# Patient Record
Sex: FEMALE | Race: WHITE | NOT HISPANIC OR LATINO
[De-identification: names, ages, dates, MRNs, and addresses within clinical notes are randomized per-mention and may not be internally consistent; named-entity substitution may affect disease eponyms.]

---

## 2017-02-20 ENCOUNTER — RESULT REVIEW (OUTPATIENT)
Age: 62
End: 2017-02-20

## 2018-02-08 ENCOUNTER — APPOINTMENT (OUTPATIENT)
Dept: INTERNAL MEDICINE | Facility: CLINIC | Age: 63
End: 2018-02-08
Payer: COMMERCIAL

## 2018-02-08 ENCOUNTER — NON-APPOINTMENT (OUTPATIENT)
Age: 63
End: 2018-02-08

## 2018-02-08 VITALS
OXYGEN SATURATION: 98 % | HEIGHT: 62.5 IN | DIASTOLIC BLOOD PRESSURE: 80 MMHG | TEMPERATURE: 98.1 F | HEART RATE: 74 BPM | BODY MASS INDEX: 24.94 KG/M2 | WEIGHT: 139 LBS | SYSTOLIC BLOOD PRESSURE: 128 MMHG

## 2018-02-08 DIAGNOSIS — Z87.01 PERSONAL HISTORY OF PNEUMONIA (RECURRENT): ICD-10-CM

## 2018-02-08 DIAGNOSIS — Z81.8 FAMILY HISTORY OF OTHER MENTAL AND BEHAVIORAL DISORDERS: ICD-10-CM

## 2018-02-08 DIAGNOSIS — Z87.19 PERSONAL HISTORY OF OTHER DISEASES OF THE DIGESTIVE SYSTEM: ICD-10-CM

## 2018-02-08 DIAGNOSIS — Z80.0 FAMILY HISTORY OF MALIGNANT NEOPLASM OF DIGESTIVE ORGANS: ICD-10-CM

## 2018-02-08 DIAGNOSIS — Z82.49 FAMILY HISTORY OF ISCHEMIC HEART DISEASE AND OTHER DISEASES OF THE CIRCULATORY SYSTEM: ICD-10-CM

## 2018-02-08 DIAGNOSIS — Z87.2 PERSONAL HISTORY OF DISEASES OF THE SKIN AND SUBCUTANEOUS TISSUE: ICD-10-CM

## 2018-02-08 DIAGNOSIS — Z78.9 OTHER SPECIFIED HEALTH STATUS: ICD-10-CM

## 2018-02-08 DIAGNOSIS — Z87.42 PERSONAL HISTORY OF OTHER DISEASES OF THE FEMALE GENITAL TRACT: ICD-10-CM

## 2018-02-08 PROCEDURE — 99386 PREV VISIT NEW AGE 40-64: CPT | Mod: 25

## 2018-02-08 PROCEDURE — 93000 ELECTROCARDIOGRAM COMPLETE: CPT

## 2018-02-08 PROCEDURE — 36415 COLL VENOUS BLD VENIPUNCTURE: CPT

## 2018-02-27 VITALS — DIASTOLIC BLOOD PRESSURE: 90 MMHG | SYSTOLIC BLOOD PRESSURE: 145 MMHG

## 2018-02-27 PROBLEM — Z82.49 FAMILY HISTORY OF ESSENTIAL HYPERTENSION: Status: ACTIVE | Noted: 2018-02-27

## 2018-02-27 PROBLEM — Z87.42 HISTORY OF OVARIAN CYST: Status: RESOLVED | Noted: 2018-02-27 | Resolved: 2018-02-27

## 2018-02-27 PROBLEM — Z87.2 HISTORY OF PILONIDAL CYST: Status: RESOLVED | Noted: 2018-02-27 | Resolved: 2018-02-27

## 2018-02-27 PROBLEM — Z87.19 HISTORY OF GASTROINTESTINAL HEMORRHAGE: Status: RESOLVED | Noted: 2018-02-27 | Resolved: 2018-02-27

## 2018-02-27 PROBLEM — Z78.9 SOCIAL ALCOHOL USE: Status: ACTIVE | Noted: 2018-02-27

## 2018-02-27 PROBLEM — Z81.8 FAMILY HISTORY OF DEPRESSION: Status: ACTIVE | Noted: 2018-02-27

## 2018-02-27 PROBLEM — Z87.01 HISTORY OF PNEUMONIA: Status: RESOLVED | Noted: 2018-02-27 | Resolved: 2018-02-27

## 2018-02-27 PROBLEM — Z80.0 FAMILY HISTORY OF MALIGNANT NEOPLASM OF COLON: Status: ACTIVE | Noted: 2018-02-27

## 2018-02-27 LAB
25(OH)D3 SERPL-MCNC: 29.1 NG/ML
APPEARANCE: CLEAR
BACTERIA: NEGATIVE
BASOPHILS # BLD AUTO: 0.03 K/UL
BASOPHILS NFR BLD AUTO: 0.7 %
BILIRUBIN URINE: NEGATIVE
BLOOD URINE: ABNORMAL
CHOLEST SERPL-MCNC: 194 MG/DL
CHOLEST/HDLC SERPL: 2.5 RATIO
COLOR: YELLOW
EOSINOPHIL # BLD AUTO: 0.05 K/UL
EOSINOPHIL NFR BLD AUTO: 1.1 %
FERRITIN SERPL-MCNC: 81 NG/ML
GLUCOSE QUALITATIVE U: NEGATIVE MG/DL
HBA1C MFR BLD HPLC: 5.2 %
HCT VFR BLD CALC: 41.3 %
HCV AB SER QL: NONREACTIVE
HCV S/CO RATIO: 0.2 S/CO
HDLC SERPL-MCNC: 77 MG/DL
HGB BLD-MCNC: 13.2 G/DL
HYALINE CASTS: 0 /LPF
IMM GRANULOCYTES NFR BLD AUTO: 0.2 %
IRON SATN MFR SERPL: 37 %
IRON SERPL-MCNC: 115 UG/DL
KETONES URINE: NEGATIVE
LDLC SERPL CALC-MCNC: 95 MG/DL
LEUKOCYTE ESTERASE URINE: NEGATIVE
LYMPHOCYTES # BLD AUTO: 1.82 K/UL
LYMPHOCYTES NFR BLD AUTO: 41 %
MAN DIFF?: NORMAL
MCHC RBC-ENTMCNC: 32 GM/DL
MCHC RBC-ENTMCNC: 32.4 PG
MCV RBC AUTO: 101.2 FL
MICROSCOPIC-UA: NORMAL
MONOCYTES # BLD AUTO: 0.54 K/UL
MONOCYTES NFR BLD AUTO: 12.2 %
NEUTROPHILS # BLD AUTO: 1.99 K/UL
NEUTROPHILS NFR BLD AUTO: 44.8 %
NITRITE URINE: NEGATIVE
PH URINE: 7
PLATELET # BLD AUTO: 220 K/UL
PROTEIN URINE: NEGATIVE MG/DL
RBC # BLD: 4.08 M/UL
RBC # FLD: 14.2 %
RED BLOOD CELLS URINE: 5 /HPF
SPECIFIC GRAVITY URINE: 1.02
SQUAMOUS EPITHELIAL CELLS: 1 /HPF
TIBC SERPL-MCNC: 307 UG/DL
TRIGL SERPL-MCNC: 110 MG/DL
UIBC SERPL-MCNC: 192 UG/DL
UROBILINOGEN URINE: NEGATIVE MG/DL
WBC # FLD AUTO: 4.44 K/UL
WHITE BLOOD CELLS URINE: 0 /HPF

## 2018-02-27 RX ORDER — CHLORHEXIDINE GLUCONATE 4 %
325 (65 FE) LIQUID (ML) TOPICAL EVERY OTHER DAY
Refills: 0 | Status: ACTIVE | COMMUNITY
Start: 2018-02-27

## 2018-02-27 RX ORDER — CLONAZEPAM 0.5 MG/1
0.5 TABLET ORAL
Refills: 0 | Status: ACTIVE | COMMUNITY
Start: 2018-02-27

## 2018-03-12 ENCOUNTER — APPOINTMENT (OUTPATIENT)
Dept: INTERNAL MEDICINE | Facility: CLINIC | Age: 63
End: 2018-03-12
Payer: COMMERCIAL

## 2018-03-12 VITALS
HEART RATE: 76 BPM | TEMPERATURE: 99.2 F | OXYGEN SATURATION: 98 % | HEIGHT: 62.5 IN | BODY MASS INDEX: 24.94 KG/M2 | DIASTOLIC BLOOD PRESSURE: 80 MMHG | WEIGHT: 139 LBS | SYSTOLIC BLOOD PRESSURE: 144 MMHG

## 2018-03-12 VITALS — DIASTOLIC BLOOD PRESSURE: 80 MMHG | SYSTOLIC BLOOD PRESSURE: 135 MMHG

## 2018-03-12 VITALS — DIASTOLIC BLOOD PRESSURE: 80 MMHG | SYSTOLIC BLOOD PRESSURE: 130 MMHG

## 2018-03-12 PROCEDURE — 99213 OFFICE O/P EST LOW 20 MIN: CPT

## 2018-03-20 ENCOUNTER — APPOINTMENT (OUTPATIENT)
Dept: UROLOGY | Facility: CLINIC | Age: 63
End: 2018-03-20
Payer: COMMERCIAL

## 2018-03-20 VITALS
SYSTOLIC BLOOD PRESSURE: 157 MMHG | DIASTOLIC BLOOD PRESSURE: 91 MMHG | TEMPERATURE: 97.7 F | HEART RATE: 69 BPM | RESPIRATION RATE: 17 BRPM

## 2018-03-20 LAB
APPEARANCE: CLEAR
BACTERIA: NEGATIVE
BILIRUBIN URINE: NEGATIVE
BLOOD URINE: ABNORMAL
COLOR: YELLOW
GLUCOSE QUALITATIVE U: NEGATIVE MG/DL
KETONES URINE: NEGATIVE
LEUKOCYTE ESTERASE URINE: NEGATIVE
MICROSCOPIC-UA: NORMAL
NITRITE URINE: NEGATIVE
PH URINE: 6.5
PROTEIN URINE: NEGATIVE MG/DL
RED BLOOD CELLS URINE: 9 /HPF
SPECIFIC GRAVITY URINE: 1.02
SQUAMOUS EPITHELIAL CELLS: 0 /HPF
UROBILINOGEN URINE: NEGATIVE MG/DL
WHITE BLOOD CELLS URINE: 2 /HPF

## 2018-03-20 PROCEDURE — 99244 OFF/OP CNSLTJ NEW/EST MOD 40: CPT

## 2018-03-22 LAB
BACTERIA UR CULT: NORMAL
CORE LAB FLUID CYTOLOGY: NORMAL

## 2018-03-27 ENCOUNTER — FORM ENCOUNTER (OUTPATIENT)
Age: 63
End: 2018-03-27

## 2018-03-28 ENCOUNTER — APPOINTMENT (OUTPATIENT)
Dept: CT IMAGING | Facility: CLINIC | Age: 63
End: 2018-03-28
Payer: COMMERCIAL

## 2018-03-28 ENCOUNTER — APPOINTMENT (OUTPATIENT)
Dept: UROLOGY | Facility: CLINIC | Age: 63
End: 2018-03-28
Payer: COMMERCIAL

## 2018-03-28 ENCOUNTER — OUTPATIENT (OUTPATIENT)
Dept: OUTPATIENT SERVICES | Facility: HOSPITAL | Age: 63
LOS: 1 days | End: 2018-03-28
Payer: COMMERCIAL

## 2018-03-28 VITALS — DIASTOLIC BLOOD PRESSURE: 76 MMHG | SYSTOLIC BLOOD PRESSURE: 131 MMHG | HEART RATE: 76 BPM | TEMPERATURE: 98.2 F

## 2018-03-28 DIAGNOSIS — Z00.8 ENCOUNTER FOR OTHER GENERAL EXAMINATION: ICD-10-CM

## 2018-03-28 DIAGNOSIS — R35.0 FREQUENCY OF MICTURITION: ICD-10-CM

## 2018-03-28 PROCEDURE — 52000 CYSTOURETHROSCOPY: CPT

## 2018-03-28 PROCEDURE — 82565 ASSAY OF CREATININE: CPT

## 2018-03-28 PROCEDURE — 74178 CT ABD&PLV WO CNTR FLWD CNTR: CPT

## 2018-03-28 PROCEDURE — 99214 OFFICE O/P EST MOD 30 MIN: CPT | Mod: 25

## 2018-03-28 PROCEDURE — 74178 CT ABD&PLV WO CNTR FLWD CNTR: CPT | Mod: 26

## 2018-03-30 DIAGNOSIS — F41.8 OTHER SPECIFIED ANXIETY DISORDERS: ICD-10-CM

## 2018-03-30 DIAGNOSIS — R31.29 OTHER MICROSCOPIC HEMATURIA: ICD-10-CM

## 2018-04-04 ENCOUNTER — RESULT REVIEW (OUTPATIENT)
Age: 63
End: 2018-04-04

## 2018-04-09 ENCOUNTER — APPOINTMENT (OUTPATIENT)
Dept: INTERNAL MEDICINE | Facility: CLINIC | Age: 63
End: 2018-04-09

## 2018-04-09 LAB
APPEARANCE: CLEAR
BACTERIA UR CULT: NORMAL
BACTERIA: NEGATIVE
BILIRUBIN URINE: NEGATIVE
BLOOD URINE: ABNORMAL
COLOR: YELLOW
CORE LAB FLUID CYTOLOGY: NORMAL
GLUCOSE QUALITATIVE U: NEGATIVE MG/DL
HYALINE CASTS: 1 /LPF
KETONES URINE: NEGATIVE
LEUKOCYTE ESTERASE URINE: NEGATIVE
MICROSCOPIC-UA: NORMAL
NITRITE URINE: NEGATIVE
PH URINE: 6
PROTEIN URINE: NEGATIVE MG/DL
RED BLOOD CELLS URINE: 6 /HPF
SPECIFIC GRAVITY URINE: 1.05
SQUAMOUS EPITHELIAL CELLS: 0 /HPF
UROBILINOGEN URINE: NEGATIVE MG/DL
WHITE BLOOD CELLS URINE: 2 /HPF

## 2018-06-28 ENCOUNTER — APPOINTMENT (OUTPATIENT)
Dept: INTERNAL MEDICINE | Facility: CLINIC | Age: 63
End: 2018-06-28
Payer: COMMERCIAL

## 2018-06-28 VITALS
OXYGEN SATURATION: 98 % | WEIGHT: 140 LBS | TEMPERATURE: 98.2 F | BODY MASS INDEX: 25.76 KG/M2 | HEART RATE: 68 BPM | HEIGHT: 62 IN

## 2018-06-28 VITALS — SYSTOLIC BLOOD PRESSURE: 128 MMHG | DIASTOLIC BLOOD PRESSURE: 80 MMHG

## 2018-06-28 PROCEDURE — 99214 OFFICE O/P EST MOD 30 MIN: CPT

## 2018-06-28 NOTE — ASSESSMENT
[FreeTextEntry1] : The blood pressure is better today- monitor.  Discussed diet and exercise.  \par \par She had the hematuria evaluation- see note from .\par \par She is due for a mammogram and she was reminded to do it.\par \par She has similar mild swallowing symptoms which she feels is in her throat.  She can feel a momentary throat throbbing.  Symptoms not worse.  She was advised to see an ENT to evaluate.

## 2018-06-28 NOTE — PHYSICAL EXAM
[No Acute Distress] : no acute distress [Well Nourished] : well nourished [Well Developed] : well developed [No Respiratory Distress] : no respiratory distress  [Clear to Auscultation] : lungs were clear to auscultation bilaterally [No Accessory Muscle Use] : no accessory muscle use [Normal Rate] : normal rate  [Regular Rhythm] : with a regular rhythm [Normal S1, S2] : normal S1 and S2 [No Murmur] : no murmur heard [Pedal Pulses Present] : the pedal pulses are present [No Edema] : there was no peripheral edema [Soft] : abdomen soft [Non Tender] : non-tender [Non-distended] : non-distended [No HSM] : no HSM

## 2019-04-26 ENCOUNTER — NON-APPOINTMENT (OUTPATIENT)
Age: 64
End: 2019-04-26

## 2019-04-26 ENCOUNTER — APPOINTMENT (OUTPATIENT)
Dept: INTERNAL MEDICINE | Facility: CLINIC | Age: 64
End: 2019-04-26
Payer: COMMERCIAL

## 2019-04-26 VITALS
TEMPERATURE: 97.5 F | OXYGEN SATURATION: 98 % | WEIGHT: 144 LBS | HEIGHT: 62 IN | BODY MASS INDEX: 26.5 KG/M2 | HEART RATE: 73 BPM

## 2019-04-26 DIAGNOSIS — Z00.00 ENCOUNTER FOR GENERAL ADULT MEDICAL EXAMINATION W/OUT ABNORMAL FINDINGS: ICD-10-CM

## 2019-04-26 PROCEDURE — 36415 COLL VENOUS BLD VENIPUNCTURE: CPT

## 2019-04-26 PROCEDURE — 93000 ELECTROCARDIOGRAM COMPLETE: CPT

## 2019-04-26 PROCEDURE — 99396 PREV VISIT EST AGE 40-64: CPT | Mod: 25

## 2019-04-26 PROCEDURE — G0444 DEPRESSION SCREEN ANNUAL: CPT

## 2019-04-27 VITALS — SYSTOLIC BLOOD PRESSURE: 120 MMHG | DIASTOLIC BLOOD PRESSURE: 72 MMHG

## 2019-04-27 NOTE — ASSESSMENT
[FreeTextEntry1] : She appears to be doing well overall from a psychiatric standpoint and she was counseled.  She sees her psychiatrist.  Check additional blood tests requested by Dr. Swanson. \par \par She has some exertional dyspnea and an occasional flutter.  The EKG is abnormal but unchanged.  We agreed she will see a cardiologist to be thorough.  She will see her 's cardiologist.  Discussed diet and exercise.\par \par Check a U/A and cytology- she had a previous hematuria evaluation which was unremarkable.  Routine recheck of the urine.\par \par Check a CBC- previous anemia.  \par \par She will see her gyn in 1-2 weeks.  Mammogram might be due and was advised.  She says her mammogram and DEXA are ordered by her gyn.  \par \par Colonoscopy and EGD about three years ago- likely five year follow-up.  She will have the report sent.  \par \par She sees a dermatologist and ophthalmologist.  \par \par She has right posterior ankle pain that might be from the Achilles- possible tendonitis.  She was advised to see a podiatrist.  \par \par

## 2019-04-27 NOTE — HEALTH RISK ASSESSMENT
[No falls in past year] : Patient reported no falls in the past year [1] : 2) Feeling down, depressed, or hopeless for several days (1) [Fully functional (bathing, dressing, toileting, transferring, walking, feeding)] : Fully functional (bathing, dressing, toileting, transferring, walking, feeding) [Fully functional (using the telephone, shopping, preparing meals, housekeeping, doing laundry, using] : Fully functional and needs no help or supervision to perform IADLs (using the telephone, shopping, preparing meals, housekeeping, doing laundry, using transportation, managing medications and managing finances) [] : No [[id number]] : 2 [Change in mental status noted] : No change in mental status noted [Reports changes in hearing] : Reports no changes in hearing [Reports changes in vision] : Reports no changes in vision [Reports changes in dental health] : Reports no changes in dental health

## 2019-05-07 ENCOUNTER — RESULT REVIEW (OUTPATIENT)
Age: 64
End: 2019-05-07

## 2019-05-08 LAB
25(OH)D3 SERPL-MCNC: 45.2 NG/ML
ALBUMIN SERPL ELPH-MCNC: 4.5 G/DL
ALP BLD-CCNC: 61 U/L
ALT SERPL-CCNC: 14 U/L
ANION GAP SERPL CALC-SCNC: 13 MMOL/L
APPEARANCE: CLEAR
AST SERPL-CCNC: 16 U/L
BACTERIA: NEGATIVE
BASOPHILS # BLD AUTO: 0.02 K/UL
BASOPHILS NFR BLD AUTO: 0.4 %
BILIRUB SERPL-MCNC: 0.4 MG/DL
BILIRUBIN URINE: NEGATIVE
BLOOD URINE: ABNORMAL
BUN SERPL-MCNC: 15 MG/DL
CALCIUM SERPL-MCNC: 9.9 MG/DL
CHLORIDE SERPL-SCNC: 102 MMOL/L
CHOLEST SERPL-MCNC: 189 MG/DL
CHOLEST/HDLC SERPL: 3 RATIO
CO2 SERPL-SCNC: 25 MMOL/L
COLOR: YELLOW
CREAT SERPL-MCNC: 0.69 MG/DL
EOSINOPHIL # BLD AUTO: 0.08 K/UL
EOSINOPHIL NFR BLD AUTO: 1.7 %
ESTIMATED AVERAGE GLUCOSE: 103 MG/DL
FOLATE SERPL-MCNC: >20 NG/ML
GLUCOSE QUALITATIVE U: NEGATIVE
GLUCOSE SERPL-MCNC: 101 MG/DL
HBA1C MFR BLD HPLC: 5.2 %
HCT VFR BLD CALC: 41.9 %
HDLC SERPL-MCNC: 63 MG/DL
HGB BLD-MCNC: 13.4 G/DL
HYALINE CASTS: 0 /LPF
IMM GRANULOCYTES NFR BLD AUTO: 0 %
KETONES URINE: NEGATIVE
LDH SERPL-CCNC: 163 U/L
LDLC SERPL CALC-MCNC: 110 MG/DL
LEUKOCYTE ESTERASE URINE: NEGATIVE
LYMPHOCYTES # BLD AUTO: 1.61 K/UL
LYMPHOCYTES NFR BLD AUTO: 34.6 %
MAN DIFF?: NORMAL
MCHC RBC-ENTMCNC: 32 GM/DL
MCHC RBC-ENTMCNC: 33.2 PG
MCV RBC AUTO: 103.7 FL
MICROSCOPIC-UA: NORMAL
MONOCYTES # BLD AUTO: 0.48 K/UL
MONOCYTES NFR BLD AUTO: 10.3 %
NEUTROPHILS # BLD AUTO: 2.46 K/UL
NEUTROPHILS NFR BLD AUTO: 53 %
NITRITE URINE: NEGATIVE
PH URINE: 6.5
PLATELET # BLD AUTO: 201 K/UL
POTASSIUM SERPL-SCNC: 4.6 MMOL/L
PROT SERPL-MCNC: 7.4 G/DL
PROTEIN URINE: NORMAL
RBC # BLD: 4.04 M/UL
RBC # FLD: 13 %
RED BLOOD CELLS URINE: 3 /HPF
SODIUM SERPL-SCNC: 140 MMOL/L
SPECIFIC GRAVITY URINE: 1.03
SQUAMOUS EPITHELIAL CELLS: 1 /HPF
T4 FREE SERPL-MCNC: 1 NG/DL
T4 SERPL-MCNC: 4.8 UG/DL
THYROGLOB AB SERPL-ACNC: <20 IU/ML
THYROPEROXIDASE AB SERPL IA-ACNC: 29.1 IU/ML
TRIGL SERPL-MCNC: 78 MG/DL
TSH SERPL-ACNC: 1.71 UIU/ML
URATE SERPL-MCNC: 3.6 MG/DL
URINE CYTOLOGY: NORMAL
UROBILINOGEN URINE: NORMAL
VIT B12 SERPL-MCNC: 595 PG/ML
WBC # FLD AUTO: 4.65 K/UL
WHITE BLOOD CELLS URINE: 1 /HPF

## 2019-05-29 ENCOUNTER — APPOINTMENT (OUTPATIENT)
Dept: ORTHOPEDIC SURGERY | Facility: CLINIC | Age: 64
End: 2019-05-29
Payer: COMMERCIAL

## 2019-05-29 DIAGNOSIS — M25.551 PAIN IN RIGHT HIP: ICD-10-CM

## 2019-05-29 PROCEDURE — 99204 OFFICE O/P NEW MOD 45 MIN: CPT

## 2019-05-29 PROCEDURE — 73502 X-RAY EXAM HIP UNI 2-3 VIEWS: CPT | Mod: RT

## 2019-05-29 NOTE — DISCUSSION/SUMMARY
[de-identified] : This patient has right buttock pain secondary to right lower extremity radiculopathy.  The right hip joint does not appear to be the source of the problem and she has a normal hip examination normal radiographs.  Control was made to physiatry for further work-up and management.  She will continue with the Tylenol.

## 2019-05-29 NOTE — PHYSICAL EXAM
[de-identified] : Patient is well nourished, well-developed, in no acute distress, with appropriate mood and affect. The patient is oriented to time, place, and person. Respirations are even and unlabored. Gait evaluation does not reveal a limp. There is no inguinal adenopathy. Examination of the contralateral hip shows normal range of motion, strength, no tenderness, and intact skin. The affected limb is well-perfused and showed 2+ dp/pt pulses, without skin lesions, shows a grossly normal motor and sensory examination. Examination of the hip shows no skin lesions. Hip motion is full and painless from 0-90 degrees extension to flexion, 20 degrees adduction and 20 degrees abduction, and 15 degrees internal and 30 degrees external rotation. Leg lengths are approximately equal. FADIR is negative and FCO is negative. Stinchfield test is negative. Both hips are stable and muscle strength is normal with good strength with resisted abduction and adduction. Pedal pulses are palpable. [de-identified] : AP and lateral x-rays of the right hip, pelvis, and femur were ordered and taken in the office and demonstrate no evidence of degenerative joint disease of the hip with maintained joint space and no evidence of fractures or other intraarticular pathology.

## 2019-05-29 NOTE — HISTORY OF PRESENT ILLNESS
[de-identified] : This is very nice 63-year-old female experiencing 5 days of right buttock pain, which is severe in intensity. The pain substantially limits activities of daily living. Walking tolerance is reduced.  This started atraumatically.  The pain is in the low back that transitions into the right buttock.  No groin pain.  The patient denies any radiation of the pain to the feet and it is not associated with numbness, tingling, or weakness.  Tylenol helps to improve the pain.  She cannot take NSAIDs because of a history of a gastric ulcer.  She does not use a cane or walker.  She has not tried physical therapy for this.

## 2019-06-05 ENCOUNTER — OUTPATIENT (OUTPATIENT)
Dept: OUTPATIENT SERVICES | Facility: HOSPITAL | Age: 64
LOS: 1 days | End: 2019-06-05
Payer: COMMERCIAL

## 2019-06-05 ENCOUNTER — APPOINTMENT (OUTPATIENT)
Dept: RADIOLOGY | Facility: CLINIC | Age: 64
End: 2019-06-05
Payer: COMMERCIAL

## 2019-06-05 DIAGNOSIS — M54.5 LOW BACK PAIN: ICD-10-CM

## 2019-06-05 PROCEDURE — 72100 X-RAY EXAM L-S SPINE 2/3 VWS: CPT

## 2019-06-05 PROCEDURE — 72100 X-RAY EXAM L-S SPINE 2/3 VWS: CPT | Mod: 26

## 2019-11-25 NOTE — HISTORY OF PRESENT ILLNESS
[FreeTextEntry1] : The patient is here for a routine visit.  [de-identified] : She sees her psychiatrist, Dr. Swanson, every two months.  She can still have some mild anxiety and depression symptoms but she is doing well overall.  She worries about her daughter who is a college student.  \par \par She sees her hematologist every six months for the history of iron deficiency.  She says her last iron tests were good.  \par \par She never saw an ENT.  She can have an occasional hoarse voice but it doesn't bother her.  No significant swallowing issue.  \par \par Her diet is mostly healthy.  She doesn't exercise.  She notes occasional dyspnea with mild exertion and she can feel a heart flutter at times.  No chest pain or pressure.   No significant past surgical history

## 2020-10-15 ENCOUNTER — INPATIENT (INPATIENT)
Facility: HOSPITAL | Age: 65
LOS: 0 days | Discharge: HOME | DRG: 312 | End: 2020-10-16
Attending: FAMILY MEDICINE | Admitting: INTERNAL MEDICINE
Payer: MEDICARE

## 2020-10-15 VITALS
DIASTOLIC BLOOD PRESSURE: 75 MMHG | SYSTOLIC BLOOD PRESSURE: 125 MMHG | HEIGHT: 64 IN | OXYGEN SATURATION: 96 % | WEIGHT: 134.92 LBS | RESPIRATION RATE: 16 BRPM | TEMPERATURE: 98 F | HEART RATE: 70 BPM

## 2020-10-15 LAB
BASOPHILS # BLD AUTO: 0.02 K/UL — SIGNIFICANT CHANGE UP (ref 0–0.2)
BASOPHILS NFR BLD AUTO: 0.4 % — SIGNIFICANT CHANGE UP (ref 0–2)
EOSINOPHIL # BLD AUTO: 0 K/UL — SIGNIFICANT CHANGE UP (ref 0–0.5)
EOSINOPHIL NFR BLD AUTO: 0 % — SIGNIFICANT CHANGE UP (ref 0–6)
HCT VFR BLD CALC: 41.8 % — SIGNIFICANT CHANGE UP (ref 34.5–45)
HGB BLD-MCNC: 14.2 G/DL — SIGNIFICANT CHANGE UP (ref 11.5–15.5)
IMM GRANULOCYTES NFR BLD AUTO: 0 % — SIGNIFICANT CHANGE UP (ref 0–1.5)
LYMPHOCYTES # BLD AUTO: 0.96 K/UL — LOW (ref 1–3.3)
LYMPHOCYTES # BLD AUTO: 19.8 % — SIGNIFICANT CHANGE UP (ref 13–44)
MCHC RBC-ENTMCNC: 33.5 PG — SIGNIFICANT CHANGE UP (ref 27–34)
MCHC RBC-ENTMCNC: 34 GM/DL — SIGNIFICANT CHANGE UP (ref 32–36)
MCV RBC AUTO: 98.6 FL — SIGNIFICANT CHANGE UP (ref 80–100)
MONOCYTES # BLD AUTO: 0.19 K/UL — SIGNIFICANT CHANGE UP (ref 0–0.9)
MONOCYTES NFR BLD AUTO: 3.9 % — SIGNIFICANT CHANGE UP (ref 2–14)
NEUTROPHILS # BLD AUTO: 3.69 K/UL — SIGNIFICANT CHANGE UP (ref 1.8–7.4)
NEUTROPHILS NFR BLD AUTO: 75.9 % — SIGNIFICANT CHANGE UP (ref 43–77)
PLATELET # BLD AUTO: 209 K/UL — SIGNIFICANT CHANGE UP (ref 150–400)
RBC # BLD: 4.24 M/UL — SIGNIFICANT CHANGE UP (ref 3.8–5.2)
RBC # FLD: 13.5 % — SIGNIFICANT CHANGE UP (ref 10.3–14.5)
WBC # BLD: 4.86 K/UL — SIGNIFICANT CHANGE UP (ref 3.8–10.5)
WBC # FLD AUTO: 4.86 K/UL — SIGNIFICANT CHANGE UP (ref 3.8–10.5)

## 2020-10-15 PROCEDURE — 70486 CT MAXILLOFACIAL W/O DYE: CPT | Mod: 26

## 2020-10-15 PROCEDURE — 70450 CT HEAD/BRAIN W/O DYE: CPT | Mod: 26

## 2020-10-15 PROCEDURE — 93010 ELECTROCARDIOGRAM REPORT: CPT

## 2020-10-15 PROCEDURE — 72125 CT NECK SPINE W/O DYE: CPT | Mod: 26

## 2020-10-15 PROCEDURE — 71045 X-RAY EXAM CHEST 1 VIEW: CPT | Mod: 26

## 2020-10-15 PROCEDURE — 76376 3D RENDER W/INTRP POSTPROCES: CPT | Mod: 26

## 2020-10-15 RX ORDER — SODIUM CHLORIDE 9 MG/ML
1000 INJECTION INTRAMUSCULAR; INTRAVENOUS; SUBCUTANEOUS ONCE
Refills: 0 | Status: COMPLETED | OUTPATIENT
Start: 2020-10-15 | End: 2020-10-15

## 2020-10-15 RX ADMIN — SODIUM CHLORIDE 1000 MILLILITER(S): 9 INJECTION INTRAMUSCULAR; INTRAVENOUS; SUBCUTANEOUS at 23:37

## 2020-10-15 NOTE — ED ADULT NURSE NOTE - OBJECTIVE STATEMENT
Pt states she was at home and having a party alone drinking. Pt states she was drinking gin. Does not know where the blood on her nose came from. Pt with noted bruise to bridge of nose. Pt states she does not know why her family sent her to ER. Pt denies falling and states she was on the couch when her family came home.

## 2020-10-15 NOTE — ED PROVIDER NOTE - OBJECTIVE STATEMENT
63 yo female with h/o anxiety and depression biba.  Pt states that her family had gone out of the house around 5pm so she decided to "have a party" with herself and drank " a bit" of gin.  Pt woke on the couch when her family came home.  +dried blood on face and hands. Pt doesn't remember falling.  Denies complaints.    Pt family was concerned because she was slurring her words and seemed confused.

## 2020-10-15 NOTE — ED PROVIDER NOTE - CARE PLAN
Principal Discharge DX:	Syncope  Secondary Diagnosis:	Alcohol intoxication  Secondary Diagnosis:	Nasal fracture  Secondary Diagnosis:	Abnormal ECG

## 2020-10-15 NOTE — ED PROVIDER NOTE - ENMT, MLM
Airway patent, Nasal mucosa clear. Mouth with normal mucosa. Throat has no vesicles, no oropharyngeal exudates and uvula is midline.  Dried blood on nose with small amount of ecchymosis to tip /bridge of nose without tenderness.  no septal hematoma. no epistaxis

## 2020-10-15 NOTE — ED ADULT TRIAGE NOTE - CHIEF COMPLAINT QUOTE
pt BIBA s/p found on floor by family members after being out all day. + AOB w/ bloody nose. pt unaware of what happened today, moves all extremities, no deformities, lacerations or abrasions noted in triage.

## 2020-10-15 NOTE — ED ADULT NURSE NOTE - NSIMPLEMENTINTERV_GEN_ALL_ED
Implemented All Fall Risk Interventions:  [place] to call system. Call bell, personal items and telephone within reach. Instruct patient to call for assistance. Room bathroom lighting operational. Non-slip footwear when patient is off stretcher. Physically safe environment: no spills, clutter or unnecessary equipment. Stretcher in lowest position, wheels locked, appropriate side rails in place. Provide visual cue, wrist band, yellow gown, etc. Monitor gait and stability. Monitor for mental status changes and reorient to person, place, and time. Review medications for side effects contributing to fall risk. Reinforce activity limits and safety measures with patient and family.

## 2020-10-16 ENCOUNTER — TRANSCRIPTION ENCOUNTER (OUTPATIENT)
Age: 65
End: 2020-10-16

## 2020-10-16 VITALS
DIASTOLIC BLOOD PRESSURE: 72 MMHG | OXYGEN SATURATION: 98 % | TEMPERATURE: 98 F | HEART RATE: 79 BPM | SYSTOLIC BLOOD PRESSURE: 131 MMHG | RESPIRATION RATE: 17 BRPM

## 2020-10-16 DIAGNOSIS — R55 SYNCOPE AND COLLAPSE: ICD-10-CM

## 2020-10-16 LAB
ALBUMIN SERPL ELPH-MCNC: 4.2 G/DL — SIGNIFICANT CHANGE UP (ref 3.3–5)
ALP SERPL-CCNC: 91 U/L — SIGNIFICANT CHANGE UP (ref 40–120)
ALT FLD-CCNC: 49 U/L — SIGNIFICANT CHANGE UP (ref 12–78)
ANION GAP SERPL CALC-SCNC: 5 MMOL/L — SIGNIFICANT CHANGE UP (ref 5–17)
APAP SERPL-MCNC: <2 UG/ML — LOW (ref 10–30)
AST SERPL-CCNC: 39 U/L — HIGH (ref 15–37)
BILIRUB SERPL-MCNC: 0.4 MG/DL — SIGNIFICANT CHANGE UP (ref 0.2–1.2)
BUN SERPL-MCNC: 10 MG/DL — SIGNIFICANT CHANGE UP (ref 7–23)
CALCIUM SERPL-MCNC: 8.4 MG/DL — LOW (ref 8.5–10.1)
CHLORIDE SERPL-SCNC: 107 MMOL/L — SIGNIFICANT CHANGE UP (ref 96–108)
CO2 SERPL-SCNC: 27 MMOL/L — SIGNIFICANT CHANGE UP (ref 22–31)
CREAT SERPL-MCNC: 0.68 MG/DL — SIGNIFICANT CHANGE UP (ref 0.5–1.3)
ETHANOL SERPL-MCNC: 343 MG/DL — HIGH (ref 0–10)
GLUCOSE SERPL-MCNC: 113 MG/DL — HIGH (ref 70–99)
HCV AB S/CO SERPL IA: 0.08 S/CO — SIGNIFICANT CHANGE UP (ref 0–0.99)
HCV AB SERPL-IMP: SIGNIFICANT CHANGE UP
PCP SPEC-MCNC: SIGNIFICANT CHANGE UP
POTASSIUM SERPL-MCNC: 4.6 MMOL/L — SIGNIFICANT CHANGE UP (ref 3.5–5.3)
POTASSIUM SERPL-SCNC: 4.6 MMOL/L — SIGNIFICANT CHANGE UP (ref 3.5–5.3)
PROT SERPL-MCNC: 10.1 GM/DL — HIGH (ref 6–8.3)
SALICYLATES SERPL-MCNC: <1.7 MG/DL — LOW (ref 2.8–20)
SARS-COV-2 IGG SERPL QL IA: NEGATIVE — SIGNIFICANT CHANGE UP
SARS-COV-2 IGM SERPL IA-ACNC: <0.1 INDEX — SIGNIFICANT CHANGE UP
SARS-COV-2 RNA SPEC QL NAA+PROBE: SIGNIFICANT CHANGE UP
SODIUM SERPL-SCNC: 139 MMOL/L — SIGNIFICANT CHANGE UP (ref 135–145)
TROPONIN I SERPL-MCNC: <0.015 NG/ML — SIGNIFICANT CHANGE UP (ref 0.01–0.04)
TROPONIN I SERPL-MCNC: <0.015 NG/ML — SIGNIFICANT CHANGE UP (ref 0.01–0.04)

## 2020-10-16 PROCEDURE — 86769 SARS-COV-2 COVID-19 ANTIBODY: CPT

## 2020-10-16 PROCEDURE — 84484 ASSAY OF TROPONIN QUANT: CPT

## 2020-10-16 PROCEDURE — 93306 TTE W/DOPPLER COMPLETE: CPT

## 2020-10-16 PROCEDURE — 99223 1ST HOSP IP/OBS HIGH 75: CPT

## 2020-10-16 PROCEDURE — U0003: CPT

## 2020-10-16 PROCEDURE — 12345: CPT | Mod: NC

## 2020-10-16 PROCEDURE — 93306 TTE W/DOPPLER COMPLETE: CPT | Mod: 26

## 2020-10-16 PROCEDURE — 86803 HEPATITIS C AB TEST: CPT

## 2020-10-16 PROCEDURE — 36415 COLL VENOUS BLD VENIPUNCTURE: CPT

## 2020-10-16 RX ORDER — ACETAMINOPHEN 500 MG
650 TABLET ORAL EVERY 6 HOURS
Refills: 0 | Status: DISCONTINUED | OUTPATIENT
Start: 2020-10-16 | End: 2020-10-16

## 2020-10-16 RX ORDER — CLONAZEPAM 1 MG
0.5 TABLET ORAL
Refills: 0 | Status: DISCONTINUED | OUTPATIENT
Start: 2020-10-16 | End: 2020-10-16

## 2020-10-16 RX ORDER — SODIUM CHLORIDE 9 MG/ML
1000 INJECTION INTRAMUSCULAR; INTRAVENOUS; SUBCUTANEOUS
Refills: 0 | Status: DISCONTINUED | OUTPATIENT
Start: 2020-10-16 | End: 2020-10-16

## 2020-10-16 RX ORDER — ONDANSETRON 8 MG/1
4 TABLET, FILM COATED ORAL EVERY 6 HOURS
Refills: 0 | Status: DISCONTINUED | OUTPATIENT
Start: 2020-10-16 | End: 2020-10-16

## 2020-10-16 RX ORDER — ASPIRIN/CALCIUM CARB/MAGNESIUM 324 MG
325 TABLET ORAL ONCE
Refills: 0 | Status: COMPLETED | OUTPATIENT
Start: 2020-10-16 | End: 2020-10-16

## 2020-10-16 RX ORDER — SERTRALINE 25 MG/1
37.5 TABLET, FILM COATED ORAL DAILY
Refills: 0 | Status: DISCONTINUED | OUTPATIENT
Start: 2020-10-16 | End: 2020-10-16

## 2020-10-16 RX ADMIN — SERTRALINE 37.5 MILLIGRAM(S): 25 TABLET, FILM COATED ORAL at 07:37

## 2020-10-16 RX ADMIN — Medication 650 MILLIGRAM(S): at 12:18

## 2020-10-16 RX ADMIN — SODIUM CHLORIDE 1000 MILLILITER(S): 9 INJECTION INTRAMUSCULAR; INTRAVENOUS; SUBCUTANEOUS at 00:03

## 2020-10-16 NOTE — CONSULT NOTE ADULT - ASSESSMENT
Syncope- high alcohol level noted in blood.  Check orthostatic BP readings with 5 mins allowed between readings.  no arrythmias on tele.  Ambulate pt if no orthostasis to reasses symptoms.  If she is asymptomatic she can be discharged and I can see her MOnday in office for f/u.  Pt expressed full understanding of the plan.    Alcohol abuse - advised about the risks of alcohol abuse.    Advised pt to quit alcohol.    Other medical issues- Management per primary team.   Thank you for allowing me to participate in the care of this patient. Please feel free to contact me with any questions.

## 2020-10-16 NOTE — ED ADULT NURSE REASSESSMENT NOTE - NS ED NURSE REASSESS COMMENT FT1
Pt received from previous RN.  Pt aaox3.  No c/o pain or discomfort, in no apparent distress.  Plan of care, transition from ED to admitted status discussed with pt.  All questions answered to pt satisfaction.   Call bell given to patient.  No additional needs at this time, will continue hourly rounding.   Pt identified as fall w harm risk. Bed in lowest locked position, pt in room directly across from the nurses station for enhanced safety monitoring. call bell within patient reach.  Pt instructed re: call bell use. Teaches back proper use and verbalizes agreement to utilize bell and wait safely for staff for assistance with needs. Pt clean and dry, all belongings in reach. Will continue hourly rounding.

## 2020-10-16 NOTE — H&P ADULT - VASCULAR
Anesthesia POST Procedure Evaluation    Patient: Marquez Anaya   MRN:     [id number] Gender:   male   Age:    66 year old :      1953        Preoperative Diagnosis: Multiple myeloma in remission (H) [C90.01]   Procedure(s):  BIOPSY, BONE MARROW   Postop Comments: No value filed.     Anesthesia Type: MAC          Postop Pain Control: Uneventful            Sign Out: Well controlled pain   PONV: No   Neuro/Psych: Uneventful            Sign Out: Acceptable/Baseline neuro status   Airway/Respiratory: Uneventful            Sign Out: Acceptable/Baseline resp. status   CV/Hemodynamics: Uneventful            Sign Out: Acceptable CV status   Other NRE: NONE   DID A NON-ROUTINE EVENT OCCUR? No         Last Anesthesia Record Vitals:  CRNA VITALS  2020 1054 - 2020 1154      2020             Pulse:  71    SpO2:  99 %          Last PACU Vitals:  Vitals Value Taken Time   BP 98/69 2020 11:30 AM   Temp 36.9  C (98.5  F) 2020 11:30 AM   Pulse 66 2020 11:30 AM   Resp 16 2020 11:30 AM   SpO2 97 % 2020 11:30 AM   Temp src Available 2020 11:17 AM   NIBP 94/67 2020 11:20 AM   Pulse 71 2020 11:23 AM   SpO2 99 % 2020 11:23 AM   Resp 11 2020 11:22 AM   Temp     Ht Rate 70 2020 11:21 AM   Temp 2           Electronically Signed By: Alec Fu MD, 2020, 3:33 PM  
detailed exam

## 2020-10-16 NOTE — CONSULT NOTE ADULT - SUBJECTIVE AND OBJECTIVE BOX
Patient is a 64y old  Female who presents with a chief complaint of Syncope.       HPI:  65 yo Female with h/o anxiety and depression BIBEMS after she was found on her couch passed out. She states that her family had gone out of the house around 5pm so she decided to "have a party" with herself and drank " a bit" of gin. Patient woke on the couch when her family  came home. She was found to have +dried blood on face and hands. Patient doesn't remember falling. She Denies any other complaints. Patient family was concerned because she was slurring her words and seemed confused.    Pt denies any previous       PAST MEDICAL & SURGICAL HISTORY:  Anxiety and depression    No significant past surgical history        MEDICATIONS  (STANDING):  sertraline 37.5 milliGRAM(s) Oral daily  sodium chloride 0.9%. 1000 milliLiter(s) (125 mL/Hr) IV Continuous <Continuous>    MEDICATIONS  (PRN):  acetaminophen   Tablet .. 650 milliGRAM(s) Oral every 6 hours PRN Mild Pain (1 - 3)  clonazePAM  Tablet 0.5 milliGRAM(s) Oral two times a day PRN Anxiety  ondansetron Injectable 4 milliGRAM(s) IV Push every 6 hours PRN Nausea and/or Vomiting      FAMILY HISTORY:  No pertinent family history in first degree relatives        SOCIAL HISTORY:    REVIEW OF SYSTEMS:  CONSTITUTIONAL:    No fatigue, malaise, lethargy.  No fever or chills.  HEENT:  Eyes:  No visual changes.     ENT:  No epistaxis.  No sinus pain.    RESPIRATORY:  No cough.  No wheeze.  No hemoptysis.  No shortness of breath.  CARDIOVASCULAR:  No chest pains.  No palpitations. No shortness of breath, No orthopnea or PND.  GASTROINTESTINAL:  No abdominal pain.  No nausea or vomiting.    GENITOURINARY:    No hematuria.    MUSCULOSKELETAL:  No musculoskeletal pain.  No joint swelling.  No arthritis.  NEUROLOGICAL:  No tingling or numbness or weakness.  PSYCHIATRIC:  No confusion  SKIN:  No rashes.    ENDOCRINE:  No unexplained weight loss.  No polydipsia.   HEMATOLOGIC:  No anemia.  No prolonged or excessive bleeding.   ALLERGIC AND IMMUNOLOGIC:  No pruritus.          Vital Signs Last 24 Hrs  T(C): 36.7 (16 Oct 2020 04:29), Max: 36.7 (16 Oct 2020 04:29)  T(F): 98 (16 Oct 2020 04:29), Max: 98 (16 Oct 2020 04:29)  HR: 68 (16 Oct 2020 04:29) (68 - 70)  BP: 124/72 (16 Oct 2020 04:29) (124/72 - 125/75)  BP(mean): --  RR: 16 (16 Oct 2020 04:29) (16 - 16)  SpO2: 97% (16 Oct 2020 04:29) (96% - 97%)    PHYSICAL EXAM-    Constitutional: The patient appears to be normal, well developed, well nourished and alert and oriented to time, place and person. The patient does not appear acutely ill.     Head: Head is normocephalic and atraumatic.      Neck: No jugular venous distention. No audible carotid bruits. There are strong carotid pulses bilaterally. No JVD.     Cardiovascular: Regular rate and rhythm without S3, S4. No murmurs or rubs are appreciated.      Respiratory: Breathsounds are normal. No rales. No wheezing.    Abdomen: Soft, nontender, nondistended with positive bowel sounds.      Extremity: No tenderness. No  pitting edema     Neurologic: The patient is alert and oriented.      Skin: No rash, no obvious lesions noted.      Psychiatric: The patient appears to be emotionally stable.      INTERPRETATION OF TELEMETRY: SR     ECG: Sinus rythm , RBBB    I&O's Detail      LABS:                        14.2   4.86  )-----------( 209      ( 15 Oct 2020 23:33 )             41.8     10-15    139  |  107  |  10  ----------------------------<  113<H>  4.6   |  27  |  0.68    Ca    8.4<L>      15 Oct 2020 23:33    TPro  10.1<H>  /  Alb  4.2  /  TBili  0.4  /  DBili  x   /  AST  39<H>  /  ALT  49  /  AlkPhos  91  10-15    CARDIAC MARKERS ( 16 Oct 2020 02:27 )  <0.015 ng/mL / x     / x     / x     / x      CARDIAC MARKERS ( 15 Oct 2020 23:33 )  <0.015 ng/mL / x     / x     / x     / x              I&O's Summary    BNP  RADIOLOGY & ADDITIONAL STUDIES: Patient is a 64y old  Female who presents with a chief complaint of Syncope.       HPI:  63 yo Female with h/o anxiety and depression BIBEMS after she was found on her couch passed out. She states that her family had gone out of the house around 5pm so she decided to "have a party" with herself and drank " a bit" of gin. Patient woke on the couch when her family  came home. She was found to have +dried blood on face and hands. Patient doesn't remember falling. She Denies any other complaints. Patient family was concerned because she was slurring her words and seemed confused.    Pt denies any previous syncopal episodes.  She denies any CP or SOB currently.         PAST MEDICAL & SURGICAL HISTORY:  Anxiety and depression    No significant past surgical history        MEDICATIONS  (STANDING):  sertraline 37.5 milliGRAM(s) Oral daily  sodium chloride 0.9%. 1000 milliLiter(s) (125 mL/Hr) IV Continuous <Continuous>    MEDICATIONS  (PRN):  acetaminophen   Tablet .. 650 milliGRAM(s) Oral every 6 hours PRN Mild Pain (1 - 3)  clonazePAM  Tablet 0.5 milliGRAM(s) Oral two times a day PRN Anxiety  ondansetron Injectable 4 milliGRAM(s) IV Push every 6 hours PRN Nausea and/or Vomiting      FAMILY HISTORY:  No pertinent family history in first degree relatives        SOCIAL HISTORY:    REVIEW OF SYSTEMS:  CONSTITUTIONAL:    No fatigue, malaise, lethargy.  No fever or chills.  RESPIRATORY:  No cough.  No wheeze.  No hemoptysis.  No shortness of breath.  CARDIOVASCULAR:  No chest pains.  No palpitations. No shortness of breath, No orthopnea or PND. c/o syncope  GASTROINTESTINAL:  No abdominal pain.  No nausea or vomiting.    GENITOURINARY:    No hematuria.    MUSCULOSKELETAL:  No musculoskeletal pain.  No joint swelling.  No arthritis.  NEUROLOGICAL:  No tingling or numbness or weakness.  PSYCHIATRIC:  No confusion  SKIN:  No rashes.          Vital Signs Last 24 Hrs  T(C): 36.7 (16 Oct 2020 04:29), Max: 36.7 (16 Oct 2020 04:29)  T(F): 98 (16 Oct 2020 04:29), Max: 98 (16 Oct 2020 04:29)  HR: 68 (16 Oct 2020 04:29) (68 - 70)  BP: 124/72 (16 Oct 2020 04:29) (124/72 - 125/75)  BP(mean): --  RR: 16 (16 Oct 2020 04:29) (16 - 16)  SpO2: 97% (16 Oct 2020 04:29) (96% - 97%)    PHYSICAL EXAM-    Constitutional:  no acute distress    Head: Head is normocephalic and atraumatic.      Neck: No JVD.     Cardiovascular: Regular rate and rhythm without S3, S4. No murmurs or rubs are appreciated.      Respiratory: Breath sounds are normal. No rales. No wheezing.    Abdomen: Soft, nontender, nondistended with positive bowel sounds.      Extremity: No tenderness. No  pitting edema     Neurologic: The patient is alert and oriented.      Skin: No rash, no obvious lesions noted.      Psychiatric: The patient appears to be emotionally stable.      INTERPRETATION OF TELEMETRY: SR     ECG: Sinus rythm , RBBB    I&O's Detail      LABS:                        14.2   4.86  )-----------( 209      ( 15 Oct 2020 23:33 )             41.8     10-15    139  |  107  |  10  ----------------------------<  113<H>  4.6   |  27  |  0.68    Ca    8.4<L>      15 Oct 2020 23:33    TPro  10.1<H>  /  Alb  4.2  /  TBili  0.4  /  DBili  x   /  AST  39<H>  /  ALT  49  /  AlkPhos  91  10-15    CARDIAC MARKERS ( 16 Oct 2020 02:27 )  <0.015 ng/mL / x     / x     / x     / x      CARDIAC MARKERS ( 15 Oct 2020 23:33 )  <0.015 ng/mL / x     / x     / x     / x              I&O's Summary    BNP  RADIOLOGY & ADDITIONAL STUDIES:  ct< from: CT Head No Cont (10.15.20 @ 23:27) >    IMPRESSION:    CT BRAIN: No acute intracranial bleeding, mass effect,or shift.    CT CERVICAL SPINE: No fracture. Mid cervical spondylosis. Trace grade 1 C4-C5 anterolisthesis.    CT FACE: Nondisplaced left nasal fracture with overlying soft tissue swelling.            ALEM SALES M.D.,ATTENDING RADIOLOGIST  This document has been electronically signed. Oct 16 2020 12:17AM    < end of copied text >

## 2020-10-16 NOTE — DISCHARGE NOTE PROVIDER - NSDCCPTREATMENT_GEN_ALL_CORE_FT
PRINCIPAL PROCEDURE  Procedure: CT head wo con  Findings and Treatment: IMPRESSION:  CT BRAIN: No acute intracranial bleeding, mass effect,or shift.  CT CERVICAL SPINE: No fracture. Mid cervical spondylosis. Trace grade 1 C4-C5 anterolisthesis.  CT FACE: Nondisplaced left nasal fracture with overlying soft tissue swelling.

## 2020-10-16 NOTE — H&P ADULT - ASSESSMENT
63 yo Female presented after syncope.    A/P:    1.  Syncope  Fall  -will follow clinically in telemetry unit  -follow echo  -follow cardiology consult  -follow Orthostatics  -follow PT eval    2.  Alcohol intoxication  -follow clinically    3.  Anxiety  Depression  -continue current meds  -no SI/HI    4.  SCD for DVT ppx

## 2020-10-16 NOTE — DISCHARGE NOTE PROVIDER - NSDCMRMEDTOKEN_GEN_ALL_CORE_FT
KlonoPIN 0.5 mg oral tablet: 1 tab(s) orally 2 times a day, As Needed for anxiety  nefazodone 200 mg oral tablet: 1 tab(s) orally once a day (at bedtime)  sertraline 25 mg oral tablet: 1.5 tab(s) orally once a day

## 2020-10-16 NOTE — H&P ADULT - NEUROLOGICAL DETAILS
responds to verbal commands/alert and oriented x 3/sensation intact/deep reflexes intact/cranial nerves intact/responds to pain/normal strength

## 2020-10-16 NOTE — H&P ADULT - NSHPPHYSICALEXAM_GEN_ALL_CORE
Vital Signs Last 24 Hrs  T(C): 36.4 (15 Oct 2020 23:16), Max: 36.4 (15 Oct 2020 23:16)  T(F): 97.6 (15 Oct 2020 23:16), Max: 97.6 (15 Oct 2020 23:16)  HR: 70 (15 Oct 2020 23:16) (70 - 70)  BP: 125/75 (15 Oct 2020 23:16) (125/75 - 125/75)  RR: 16 (15 Oct 2020 23:16) (16 - 16)  SpO2: 96% (15 Oct 2020 23:16) (96% - 96%)

## 2020-10-16 NOTE — DISCHARGE NOTE PROVIDER - CARE PROVIDER_API CALL
Candice Rios  CARDIOVASCULAR DISEASE  [address] [place] [address]  Phone: [phone]  Fax: [phone]  Follow Up Time:

## 2020-10-16 NOTE — DISCHARGE NOTE NURSING/CASE MANAGEMENT/SOCIAL WORK - PATIENT PORTAL LINK FT
You can access the FollowMyHealth Patient Portal offered by [facility] by registering at the following website: [email or web address]. By joining [facility]’s FollowMyHealth portal, you will also be able to view your health information using other applications (apps) compatible with our system.

## 2020-10-16 NOTE — DISCHARGE NOTE PROVIDER - NSDCCPCAREPLAN_GEN_ALL_CORE_FT
PRINCIPAL DISCHARGE DIAGNOSIS  Diagnosis: Syncope  Assessment and Plan of Treatment: - high alcohol levels noted, no arrhythmia on cardiac monitor   - orthostatics - negative   - trops negative x 2  - Seen by cardiology - stable for discharge with outpatient follow up  will follow up results of echocardiogram with cardiology      SECONDARY DISCHARGE DIAGNOSES  Diagnosis: Nasal fracture  Assessment and Plan of Treatment: non displaed left nasal fracture  ice packs on/off    Diagnosis: Alcohol intoxication  Assessment and Plan of Treatment:  for alcohol cessation

## 2020-10-16 NOTE — DISCHARGE NOTE PROVIDER - HOSPITAL COURSE
63 yo Female with h/o anxiety and depression BIBEMS after she was found on her couch passed out.    # Syncope  - high alcohol levels noted, no arrhythmia on cardiac monitor   - orthostatics - negative   - trops negative x 2  - TTE done, results pending  - Seen by cardiology - stable for discharge with outpatient follow up    # ETOH  - counselled for drinking cessation    # Depression/anxiety  - cont. home meds     # Non displaced nasal fracture  - pt. denies pain   - ice compresses on/off    Pt. is stable to be discharged home, will follow up with cardiology - Blanca Carranza  and will follow up TTE results with cardiology.     PHYSICAL EXAM  Vital Signs Last 24 Hrs  T(C): 36.7 (16 Oct 2020 08:59), Max: 36.7 (16 Oct 2020 04:29)  T(F): 98.1 (16 Oct 2020 08:59), Max: 98.1 (16 Oct 2020 08:59)  HR: 79 (16 Oct 2020 08:59) (68 - 79)  BP: 131/72 (16 Oct 2020 08:59) (124/72 - 131/72)  BP(mean): --  RR: 17 (16 Oct 2020 08:59) (16 - 17)  SpO2: 98% (16 Oct 2020 08:59) (96% - 98%)  Constitutional:  no acute distress  Head: Head is normocephalic and atraumatic.    Neck: No JVD.   Cardiovascular: Regular rate and rhythm without S3, S4. No murmurs or rubs are appreciated.    Respiratory: Breath sounds are normal. No rales. No wheezing.  Abdomen: Soft, nontender, nondistended with positive bowel sounds.    Extremity: No tenderness. No  pitting edema   Neurologic: The patient is alert and oriented.    Skin: No rash, no obvious lesions noted.    Psychiatric: The patient appears to be emotionally stable.

## 2020-10-16 NOTE — ED ADULT NURSE REASSESSMENT NOTE - NSIMPLEMENTINTERV_GEN_ALL_ED
Implemented All Fall Risk Interventions:  [place] to call system. Call bell, personal items and telephone within reach. Instruct patient to call for assistance. Room bathroom lighting operational. Non-slip footwear when patient is off stretcher. Physically safe environment: no spills, clutter or unnecessary equipment. Stretcher in lowest position, wheels locked, appropriate side rails in place. Provide visual cue, wrist band, yellow gown, etc. Monitor gait and stability. Monitor for mental status changes and reorient to person, place, and time. Review medications for side effects contributing to fall risk. Reinforce activity limits and safety measures with patient and family.

## 2020-10-16 NOTE — H&P ADULT - HISTORY OF PRESENT ILLNESS
65 yo female with h/o anxiety and depression biba.  Pt states that her family had gone out of the house around 5pm so she decided to "have a party" with herself and drank " a bit" of gin.  Pt woke on the couch when her family came home.  +dried blood on face and hands. Pt doesn't remember falling.  Denies complaints.    Pt family was concerned because she was slurring her words and seemed confused. 65 yo Female with h/o anxiety and depression BIBEMS after she was found on her couch passed out. She states that her family had gone out of the house around 5pm so she decided to "have a party" with herself and drank " a bit" of gin. Patient woke on the couch when her family  came home. She was found to have +dried blood on face and hands. Patient doesn't remember falling. She Denies any other complaints. Patient family was concerned because she was slurring her words and seemed confused.

## 2020-10-20 DIAGNOSIS — R55 SYNCOPE AND COLLAPSE: ICD-10-CM
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
## 2020-10-20 DIAGNOSIS — S02.2XXA FRACTURE OF NASAL BONES, INITIAL ENCOUNTER FOR CLOSED FRACTURE: ICD-10-CM

## 2020-10-20 DIAGNOSIS — F10.129 ALCOHOL ABUSE WITH INTOXICATION, UNSPECIFIED: ICD-10-CM

## 2020-10-20 DIAGNOSIS — F32.9 MAJOR DEPRESSIVE DISORDER, SINGLE EPISODE, UNSPECIFIED: ICD-10-CM

## 2020-10-20 DIAGNOSIS — F41.9 ANXIETY DISORDER, UNSPECIFIED: ICD-10-CM
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
## 2020-10-20 DIAGNOSIS — M47.812 SPONDYLOSIS WITHOUT MYELOPATHY OR RADICULOPATHY, CERVICAL REGION: ICD-10-CM

## 2020-11-27 ENCOUNTER — APPOINTMENT (OUTPATIENT)
Dept: INTERNAL MEDICINE | Facility: CLINIC | Age: 65
End: 2020-11-27
Payer: MEDICARE

## 2020-11-27 VITALS
TEMPERATURE: 95.7 F | DIASTOLIC BLOOD PRESSURE: 72 MMHG | WEIGHT: 144 LBS | OXYGEN SATURATION: 97 % | SYSTOLIC BLOOD PRESSURE: 120 MMHG | HEART RATE: 83 BPM | BODY MASS INDEX: 26.5 KG/M2 | HEIGHT: 62 IN

## 2020-11-27 DIAGNOSIS — Z85.820 PERSONAL HISTORY OF MALIGNANT MELANOMA OF SKIN: ICD-10-CM

## 2020-11-27 PROCEDURE — G0009: CPT

## 2020-11-27 PROCEDURE — 36415 COLL VENOUS BLD VENIPUNCTURE: CPT

## 2020-11-27 PROCEDURE — G0402 INITIAL PREVENTIVE EXAM: CPT

## 2020-11-27 PROCEDURE — 90670 PCV13 VACCINE IM: CPT

## 2020-11-28 VITALS — SYSTOLIC BLOOD PRESSURE: 122 MMHG | DIASTOLIC BLOOD PRESSURE: 75 MMHG

## 2020-11-28 NOTE — ASSESSMENT
[FreeTextEntry1] : The patient was counseled regarding the ETOH.  She admits that she has been drinking too much and she has reduced it since the recent episode.  She sees her psychiatrist regularly but she hasn't told him about this because she is embarrassed.  I advised her to inform him so he can be involved in her care.  She understands and will consider it.\par \par Discussed diet and exercise.  Check lipids.\par \par She is going to follow-up with a cardiologist as above.\par \par Mammogram overdue and advised.  DEXA due.  Gyn visit due and advised.\par \par Colonoscopy and EGD were 4-5 years ago by her report.  She was advised to have the report sent so we can see if she is due for another.  \par \par Prevnar today- age 64.  S/p flu vaccine.  Shingrix advised at pharmacy.  \par \par Check a COVID-19 antibody.  \par \par Check a CBC and iron studies - low in past and she hasn't seen her hematologist.\par \par She is due for routine dermatology and ophthalmology visits.  Note she has a history of melanoma.   No

## 2020-11-28 NOTE — HISTORY OF PRESENT ILLNESS
[FreeTextEntry1] : The patient is here for a routine visit.  [de-identified] : The patient had a recent episode- she had dental work earlier in the day and after she came home she drank an excessive amount of ETOH- she admits that she drank too much.  She had a LOC and was found by her  and daughter when they came home.  She was taken to the [facility] ED and evaluated and discharged.  Since then she has reduced the ETOH intake- she says she is now only drinking a small amount on occasion.  \par \par She saw a cardiologist after discharge as had been advised but she now plans to follow-up with Dr. Thacker who her  sees.  She denies chest pain or dyspnea.\par \par She sees her psychiatrist, Dr. Swanson and she sees or speaks to him every two months.  \par \par Her diet is healthy.  She does some walking but she doesn't exercise.

## 2020-11-28 NOTE — HEALTH RISK ASSESSMENT
[Yes] : Yes [No falls in past year] : Patient reported no falls in the past year [0] : 2) Feeling down, depressed, or hopeless: Not at all (0) [Fully functional (bathing, dressing, toileting, transferring, walking, feeding)] : Fully functional (bathing, dressing, toileting, transferring, walking, feeding) [Fully functional (using the telephone, shopping, preparing meals, housekeeping, doing laundry, using] : Fully functional and needs no help or supervision to perform IADLs (using the telephone, shopping, preparing meals, housekeeping, doing laundry, using transportation, managing medications and managing finances) [] : No [[id number]] : 0 [Reports changes in hearing] : Reports no changes in hearing [Reports changes in vision] : Reports no changes in vision [Reports changes in dental health] : Reports no changes in dental health

## 2020-11-29 ENCOUNTER — NON-APPOINTMENT (OUTPATIENT)
Age: 65
End: 2020-11-29

## 2020-11-29 LAB
25(OH)D3 SERPL-MCNC: 27.9 NG/ML
ALBUMIN SERPL ELPH-MCNC: 4.9 G/DL
ALP BLD-CCNC: 97 U/L
ALT SERPL-CCNC: 14 U/L
ANION GAP SERPL CALC-SCNC: 10 MMOL/L
APPEARANCE: CLEAR
AST SERPL-CCNC: 16 U/L
BACTERIA: NEGATIVE
BASOPHILS # BLD AUTO: 0.04 K/UL
BASOPHILS NFR BLD AUTO: 0.8 %
BILIRUB SERPL-MCNC: 0.6 MG/DL
BILIRUBIN URINE: NEGATIVE
BLOOD URINE: NEGATIVE
BUN SERPL-MCNC: 19 MG/DL
CALCIUM SERPL-MCNC: 10.1 MG/DL
CHLORIDE SERPL-SCNC: 104 MMOL/L
CHOLEST SERPL-MCNC: 211 MG/DL
CO2 SERPL-SCNC: 26 MMOL/L
COLOR: YELLOW
CREAT SERPL-MCNC: 0.68 MG/DL
EOSINOPHIL # BLD AUTO: 0.05 K/UL
EOSINOPHIL NFR BLD AUTO: 1 %
ESTIMATED AVERAGE GLUCOSE: 108 MG/DL
FERRITIN SERPL-MCNC: 136 NG/ML
FOLATE SERPL-MCNC: 5.7 NG/ML
GLUCOSE QUALITATIVE U: NEGATIVE
GLUCOSE SERPL-MCNC: 94 MG/DL
HBA1C MFR BLD HPLC: 5.4 %
HCT VFR BLD CALC: 45.3 %
HDLC SERPL-MCNC: 57 MG/DL
HGB BLD-MCNC: 14.5 G/DL
HYALINE CASTS: 0 /LPF
IMM GRANULOCYTES NFR BLD AUTO: 0.2 %
IRON SATN MFR SERPL: 35 %
IRON SERPL-MCNC: 109 UG/DL
KETONES URINE: NEGATIVE
LDLC SERPL CALC-MCNC: 119 MG/DL
LEUKOCYTE ESTERASE URINE: NEGATIVE
LYMPHOCYTES # BLD AUTO: 1.79 K/UL
LYMPHOCYTES NFR BLD AUTO: 35.2 %
MAN DIFF?: NORMAL
MCHC RBC-ENTMCNC: 32 GM/DL
MCHC RBC-ENTMCNC: 32.7 PG
MCV RBC AUTO: 102.3 FL
MICROSCOPIC-UA: NORMAL
MONOCYTES # BLD AUTO: 0.65 K/UL
MONOCYTES NFR BLD AUTO: 12.8 %
NEUTROPHILS # BLD AUTO: 2.55 K/UL
NEUTROPHILS NFR BLD AUTO: 50 %
NITRITE URINE: NEGATIVE
NONHDLC SERPL-MCNC: 154 MG/DL
PH URINE: 5.5
PLATELET # BLD AUTO: 231 K/UL
POTASSIUM SERPL-SCNC: 5.1 MMOL/L
PROT SERPL-MCNC: 7.6 G/DL
PROTEIN URINE: NEGATIVE
RBC # BLD: 4.43 M/UL
RBC # FLD: 13 %
RED BLOOD CELLS URINE: 3 /HPF
SARS-COV-2 IGG SERPL IA-ACNC: 0.08 INDEX
SARS-COV-2 IGG SERPL QL IA: NEGATIVE
SODIUM SERPL-SCNC: 140 MMOL/L
SPECIFIC GRAVITY URINE: 1.02
SQUAMOUS EPITHELIAL CELLS: 0 /HPF
T3FREE SERPL-MCNC: 2.75 PG/ML
T4 FREE SERPL-MCNC: 0.9 NG/DL
THYROGLOB AB SERPL-ACNC: 33.8 IU/ML
THYROPEROXIDASE AB SERPL IA-ACNC: 29.5 IU/ML
TIBC SERPL-MCNC: 314 UG/DL
TRIGL SERPL-MCNC: 175 MG/DL
TSH SERPL-ACNC: 0.94 UIU/ML
UIBC SERPL-MCNC: 204 UG/DL
UROBILINOGEN URINE: NORMAL
VIT B12 SERPL-MCNC: 286 PG/ML
WBC # FLD AUTO: 5.09 K/UL
WHITE BLOOD CELLS URINE: 2 /HPF

## 2020-11-30 RX ORDER — CLONAZEPAM 1 MG
1 TABLET ORAL
Qty: 0 | Refills: 0 | DISCHARGE

## 2020-11-30 RX ORDER — NEFAZODONE HYDROCHLORIDE 200 MG/1
1 TABLET ORAL
Qty: 0 | Refills: 0 | DISCHARGE

## 2020-11-30 RX ORDER — SERTRALINE 25 MG/1
1.5 TABLET, FILM COATED ORAL
Qty: 0 | Refills: 0 | DISCHARGE

## 2020-12-07 ENCOUNTER — NON-APPOINTMENT (OUTPATIENT)
Age: 65
End: 2020-12-07

## 2020-12-07 ENCOUNTER — APPOINTMENT (OUTPATIENT)
Dept: CARDIOLOGY | Facility: CLINIC | Age: 65
End: 2020-12-07
Payer: MEDICARE

## 2020-12-07 VITALS
OXYGEN SATURATION: 97 % | TEMPERATURE: 96 F | DIASTOLIC BLOOD PRESSURE: 88 MMHG | WEIGHT: 145 LBS | SYSTOLIC BLOOD PRESSURE: 142 MMHG | HEART RATE: 64 BPM | BODY MASS INDEX: 22.76 KG/M2 | HEIGHT: 67 IN

## 2020-12-07 VITALS — DIASTOLIC BLOOD PRESSURE: 68 MMHG | SYSTOLIC BLOOD PRESSURE: 122 MMHG

## 2020-12-07 DIAGNOSIS — F41.8 OTHER SPECIFIED ANXIETY DISORDERS: ICD-10-CM

## 2020-12-07 DIAGNOSIS — R55 SYNCOPE AND COLLAPSE: ICD-10-CM

## 2020-12-07 PROCEDURE — 93000 ELECTROCARDIOGRAM COMPLETE: CPT

## 2020-12-07 PROCEDURE — 99204 OFFICE O/P NEW MOD 45 MIN: CPT

## 2020-12-07 NOTE — DISCUSSION/SUMMARY
[FreeTextEntry1] : Patient is a 64 year-old woman with evidence of conduction disease on her ECG who had syncope in the setting of excessive alcohol use. Patient is also taking antidepressants and anxiolytics. \par Echocardiogram without structural heart disease to explain syncope. \par \par Obtain results of stress test.\par \par Zio patch x2 weeks to evaluate for heart block and arrhythmia.\par \par

## 2020-12-07 NOTE — REASON FOR VISIT
[Initial Evaluation] : an initial evaluation of [Abnormal ECG] : an abnormal ECG [Syncope] : syncope

## 2020-12-07 NOTE — PHYSICAL EXAM
[General Appearance - Well Developed] : well developed [Normal Appearance] : normal appearance [Well Groomed] : well groomed [General Appearance - Well Nourished] : well nourished [No Deformities] : no deformities [General Appearance - In No Acute Distress] : no acute distress [Conjunctiva] : the conjunctiva were normal in both eyes [PERRL] : pupils were equal in size, round, and reactive to light [EOM Intact] : extraocular movements were intact [Normal Oral Mucosa] : normal oral mucosa [No Oral Pallor] : no oral pallor [No Oral Cyanosis] : no oral cyanosis [Normal Oropharynx] : normal oropharynx [Normal Jugular Venous A Waves Present] : normal jugular venous A waves present [Normal Jugular Venous V Waves Present] : normal jugular venous V waves present [No Jugular Venous Adames A Waves] : no jugular venous adames A waves [5th Left ICS - MCL] : palpated at the 5th LICS in the midclavicular line [Normal] : normal [No Precordial Heave] : no precordial heave was noted [Normal Rate] : normal [Rhythm Regular] : regular [Normal S1] : normal S1 [Normal S2] : normal S2 [No Gallop] : no gallop heard [No Murmur] : no murmurs heard [2+] : left 2+ [No Pitting Edema] : no pitting edema present [] : no respiratory distress [Respiration, Rhythm And Depth] : normal respiratory rhythm and effort [Exaggerated Use Of Accessory Muscles For Inspiration] : no accessory muscle use [Auscultation Breath Sounds / Voice Sounds] : lungs were clear to auscultation bilaterally [Bowel Sounds] : normal bowel sounds [Abdomen Soft] : soft [Abdomen Tenderness] : non-tender [Abnormal Walk] : normal gait [Gait - Sufficient For Exercise Testing] : the gait was sufficient for exercise testing [Nail Clubbing] : no clubbing of the fingernails [Cyanosis, Localized] : no localized cyanosis [Skin Color & Pigmentation] : normal skin color and pigmentation [No Venous Stasis] : no venous stasis [No Xanthoma] : no  xanthoma was observed [Oriented To Time, Place, And Person] : oriented to person, place, and time [Impaired Insight] : insight and judgment were intact [Affect] : the affect was normal [Mood] : the mood was normal [No Anxiety] : not feeling anxious [Yellow Sclera (Icteric)] : no scleral icterus was seen [Right Carotid Bruit] : no bruit heard over the right carotid [Left Carotid Bruit] : no bruit heard over the left carotid

## 2020-12-07 NOTE — HISTORY OF PRESENT ILLNESS
[FreeTextEntry1] : Patient is a 64 year-old with history of anxiety and depression, maintained on SSRI and nefazodone, regular alcohol use but no history of alcohol abuse or dependence since college (when she reports drinking heavily). She has a history of alcoholism in the family, but she has never felt dependent on it.\par She also has a history of bleeding gastric ulcer in the setting of taking Aleve BID.\par Patient had been having two large glasses of wine per night. On October 16, 2020, she had a dentist visit (empty stomach afterwards), and made herself a gin and tonic (and apparently at least a second one), and was found on the floor by her daughter when she arrived at home. Her last coherent text message was around 5:30 pm, but she was not found until 10:30 pm. During the interim, she sent multiple incoherent text messages (and drank more, but she does not recall this).\par \par She was taken to [facility] where she had CT showing nasal fracture, but no intracranial pathology. She was evaluated by cardiology. As an outpatient, she then had echocardiogram and nuclear stress test performed. She has not heard from the cardiologist regarding the results.\par \par PMD: Arvind Montejo MD [phone]\par Psychiatrist: Jesse Swanson MD [phone]

## 2021-03-28 ENCOUNTER — TRANSCRIPTION ENCOUNTER (OUTPATIENT)
Age: 66
End: 2021-03-28

## 2021-04-04 ENCOUNTER — TRANSCRIPTION ENCOUNTER (OUTPATIENT)
Age: 66
End: 2021-04-04

## 2021-06-24 ENCOUNTER — APPOINTMENT (OUTPATIENT)
Dept: INTERNAL MEDICINE | Facility: CLINIC | Age: 66
End: 2021-06-24
Payer: MEDICARE

## 2021-06-24 VITALS
HEART RATE: 68 BPM | HEIGHT: 62 IN | BODY MASS INDEX: 26.31 KG/M2 | WEIGHT: 143 LBS | OXYGEN SATURATION: 97 % | TEMPERATURE: 97.9 F

## 2021-06-24 VITALS — SYSTOLIC BLOOD PRESSURE: 125 MMHG | DIASTOLIC BLOOD PRESSURE: 78 MMHG

## 2021-06-24 DIAGNOSIS — R30.0 DYSURIA: ICD-10-CM

## 2021-06-24 PROBLEM — F41.9 ANXIETY DISORDER, UNSPECIFIED: Chronic | Status: ACTIVE | Noted: 2020-10-15

## 2021-06-24 LAB
BILIRUB UR QL STRIP: NORMAL
GLUCOSE UR-MCNC: NORMAL
HCG UR QL: 0.2 EU/DL
HGB UR QL STRIP.AUTO: NORMAL
KETONES UR-MCNC: NORMAL
LEUKOCYTE ESTERASE UR QL STRIP: NORMAL
NITRITE UR QL STRIP: NORMAL
PH UR STRIP: 5.5
PROT UR STRIP-MCNC: NORMAL
SP GR UR STRIP: 1.02

## 2021-06-24 PROCEDURE — 96372 THER/PROPH/DIAG INJ SC/IM: CPT

## 2021-06-24 PROCEDURE — 99214 OFFICE O/P EST MOD 30 MIN: CPT | Mod: 25

## 2021-06-24 PROCEDURE — 81003 URINALYSIS AUTO W/O SCOPE: CPT | Mod: QW

## 2021-06-24 RX ORDER — NEFAZODONE HYDROCHLORIDE 200 MG/1
200 TABLET ORAL DAILY
Refills: 0 | Status: DISCONTINUED | COMMUNITY
Start: 2018-02-27 | End: 2021-06-24

## 2021-06-24 RX ORDER — CYANOCOBALAMIN 1000 UG/ML
1000 INJECTION INTRAMUSCULAR; SUBCUTANEOUS
Qty: 0 | Refills: 0 | Status: COMPLETED | OUTPATIENT
Start: 2021-06-24

## 2021-06-24 RX ADMIN — CYANOCOBALAMIN 0 MCG/ML: 1000 INJECTION INTRAMUSCULAR; SUBCUTANEOUS at 00:00

## 2021-06-24 NOTE — HISTORY OF PRESENT ILLNESS
[FreeTextEntry8] : The patient has symptoms for 6-7 days.  She has intermittent urinary frequency, a ticklish pressure, itchy urinary feeling, cloudy urine today.  No fever, gross hematuria, back pain, lower abdominal pain, urgency.\par \par She has a puffiness under the left eye for three days and there is pain to blink.\par \par She sees her psychiatrist and she is now on Trazodone.

## 2021-06-24 NOTE — ASSESSMENT
[FreeTextEntry1] : The patient has symptoms to suggest a UTI and the U/A here is positive.  Will treat with Nitrofurantoin and check a UCX.  Po fluids advised.\par \par She has a slight swelling of the left upper cheek under the eye.  There might be the residual chip of an insect bite which could be the cause.  Monitor for now and she could see a dermatologist if it persists.\par \par She has had the COVID-19 vaccine.  \par \par B-12 shot today and oral B-12 again advised.  \par \par She is seeing her psychiatrist and she has discussed the ETOH issue.

## 2021-06-26 ENCOUNTER — NON-APPOINTMENT (OUTPATIENT)
Age: 66
End: 2021-06-26

## 2021-06-26 LAB — BACTERIA UR CULT: NORMAL

## 2021-07-01 ENCOUNTER — APPOINTMENT (OUTPATIENT)
Dept: INTERNAL MEDICINE | Facility: CLINIC | Age: 66
End: 2021-07-01
Payer: MEDICARE

## 2021-07-01 PROCEDURE — 96372 THER/PROPH/DIAG INJ SC/IM: CPT

## 2021-07-01 RX ORDER — CYANOCOBALAMIN 1000 UG/ML
1000 INJECTION INTRAMUSCULAR; SUBCUTANEOUS
Qty: 0 | Refills: 0 | Status: COMPLETED | OUTPATIENT
Start: 2021-07-01

## 2021-07-08 ENCOUNTER — APPOINTMENT (OUTPATIENT)
Dept: ORTHOPEDIC SURGERY | Facility: CLINIC | Age: 66
End: 2021-07-08
Payer: MEDICARE

## 2021-07-08 ENCOUNTER — APPOINTMENT (OUTPATIENT)
Dept: INTERNAL MEDICINE | Facility: CLINIC | Age: 66
End: 2021-07-08
Payer: MEDICARE

## 2021-07-08 VITALS
DIASTOLIC BLOOD PRESSURE: 76 MMHG | WEIGHT: 140 LBS | HEIGHT: 62 IN | BODY MASS INDEX: 25.76 KG/M2 | HEART RATE: 80 BPM | SYSTOLIC BLOOD PRESSURE: 130 MMHG

## 2021-07-08 DIAGNOSIS — M25.561 PAIN IN RIGHT KNEE: ICD-10-CM

## 2021-07-08 PROCEDURE — 20610 DRAIN/INJ JOINT/BURSA W/O US: CPT | Mod: RT

## 2021-07-08 PROCEDURE — 99214 OFFICE O/P EST MOD 30 MIN: CPT | Mod: 25

## 2021-07-08 PROCEDURE — 96372 THER/PROPH/DIAG INJ SC/IM: CPT

## 2021-07-08 PROCEDURE — 73564 X-RAY EXAM KNEE 4 OR MORE: CPT | Mod: RT

## 2021-07-08 RX ORDER — CYANOCOBALAMIN 1000 UG/ML
1000 INJECTION INTRAMUSCULAR; SUBCUTANEOUS
Qty: 0 | Refills: 0 | Status: COMPLETED | OUTPATIENT
Start: 2021-07-07

## 2021-07-08 RX ADMIN — CYANOCOBALAMIN 0 MCG/ML: 1000 INJECTION INTRAMUSCULAR; SUBCUTANEOUS at 00:00

## 2021-07-08 NOTE — DISCUSSION/SUMMARY
[de-identified] : This patient has right medial knee pain with positive meniscal signs.  The patient is not an appropriate candidate for surgical intervention at this time. An extensive discussion was conducted on the natural history of the disease and the variety of surgical and non-surgical options available to the patient including, but not limited to non-steroidal anti-inflammatory medications, steroid injections, physical therapy, maintenance of ideal body weight, and reduction of activity.  I recommended and prescribed a course of Celebrex and physical therapy.  The patient is also encouraged to trial a neoprene sleeve knee brace which can be purchased OTC.  The patient is also encouraged to consider use of a cane.  Today we performed a right knee intra-articular cortisone injection.\par The patient will schedule an appointment as needed.\par \par Informed consent for the right knee injection was obtained. All questions were answered. A time out was performed. The right knee was prepped and draped in sterile fashion. Using sterile technique, the right knee was injected with 2cc of Kenalog, 4cc of 1% lidocaine, 4cc of 0.25% marcaine using a 21-gauge needle. A sterile dressing was applied. Post injection instructions were reviewed. The patient tolerated the procedure well.\par

## 2021-07-08 NOTE — HISTORY OF PRESENT ILLNESS
[de-identified] : Is a very pleasant 65-year-old female comes in with new complaint today of 3 weeks of right knee pain that is severe in intensity.  She notes the knee is giving way and clicking.  This is no inciting trauma.  Use a neoprene sleeve which does help.  Tylenol does help.  She did not take NSAIDs.  Has not tried physical therapy.  She does not know what caused this.  This is never happened before.  The patient denies any radiation of the pain to the feet and it is not associated with numbness, tingling, or weakness.

## 2021-07-08 NOTE — PHYSICAL EXAM
[de-identified] : Patient is well nourished, well-developed, in no acute distress, with appropriate mood and affect. The patient is oriented to time, place, and person. Respirations are even and unlabored. Gait evaluation does not reveal a limp. There is no inguinal adenopathy. Examination of the contralateral knee shows normal range of motion, strength, no tenderness, and intact skin. The affected limb is well-perfused and showed 2+ dp/pt pulses, without skin lesions, shows a grossly normal motor and sensory examination. Knee motion is painless and the knee moves from [0 to 135] degrees. The knee is stable within that range-of-motion to AP and ML stress with a 1A Lachman, negative anterior or posterior drawer and no instability to varus or valgus stress. The alignment of the knee is [5 degrees varus]. No effusion or crepitus is noted.  Tender to palpation of the medial joint line.  No tenderness to palpation about the lateral joint line, medial or lateral tibial plateau, medial or lateral femoral condyle, medial or lateral patellar facets, superior or inferior pole of the patella. John's is positive medially. Muscle strength is normal. Pedal pulses are palpable. Hip examination was negative. [de-identified] : Long standing knee, AP knee, lateral knee, and patellar views of the right knee were ordered and taken in the office and demonstrate no evidence of degenerative joint disease of the knee, fractures, intra-articular pathology.

## 2021-07-15 ENCOUNTER — APPOINTMENT (OUTPATIENT)
Dept: INTERNAL MEDICINE | Facility: CLINIC | Age: 66
End: 2021-07-15
Payer: MEDICARE

## 2021-07-15 PROCEDURE — 96372 THER/PROPH/DIAG INJ SC/IM: CPT

## 2021-07-15 RX ORDER — CYANOCOBALAMIN 1000 UG/ML
1000 INJECTION INTRAMUSCULAR; SUBCUTANEOUS
Qty: 0 | Refills: 0 | Status: COMPLETED | OUTPATIENT
Start: 2021-07-08

## 2021-08-16 ENCOUNTER — APPOINTMENT (OUTPATIENT)
Dept: INTERNAL MEDICINE | Facility: CLINIC | Age: 66
End: 2021-08-16
Payer: MEDICARE

## 2021-08-16 PROCEDURE — 96372 THER/PROPH/DIAG INJ SC/IM: CPT

## 2021-08-16 RX ADMIN — CYANOCOBALAMIN 0 MCG/ML: 1000 INJECTION INTRAMUSCULAR; SUBCUTANEOUS at 00:00

## 2021-08-17 RX ORDER — CYANOCOBALAMIN 1000 UG/ML
1000 INJECTION INTRAMUSCULAR; SUBCUTANEOUS
Qty: 0 | Refills: 0 | Status: COMPLETED | OUTPATIENT
Start: 2021-08-16

## 2021-09-17 ENCOUNTER — APPOINTMENT (OUTPATIENT)
Dept: INTERNAL MEDICINE | Facility: CLINIC | Age: 66
End: 2021-09-17
Payer: MEDICARE

## 2021-09-17 PROCEDURE — 90662 IIV NO PRSV INCREASED AG IM: CPT

## 2021-09-17 PROCEDURE — G0008: CPT

## 2021-09-17 RX ADMIN — CYANOCOBALAMIN 0 MCG/ML: 1000 INJECTION INTRAMUSCULAR; SUBCUTANEOUS at 00:00

## 2021-09-30 ENCOUNTER — APPOINTMENT (OUTPATIENT)
Dept: OBGYN | Facility: CLINIC | Age: 66
End: 2021-09-30
Payer: MEDICARE

## 2021-09-30 VITALS
SYSTOLIC BLOOD PRESSURE: 130 MMHG | WEIGHT: 142 LBS | HEIGHT: 63 IN | DIASTOLIC BLOOD PRESSURE: 70 MMHG | BODY MASS INDEX: 25.16 KG/M2

## 2021-09-30 DIAGNOSIS — A60.04 HERPESVIRAL VULVOVAGINITIS: ICD-10-CM

## 2021-09-30 DIAGNOSIS — Z80.1 FAMILY HISTORY OF MALIGNANT NEOPLASM OF TRACHEA, BRONCHUS AND LUNG: ICD-10-CM

## 2021-09-30 DIAGNOSIS — Z87.19 PERSONAL HISTORY OF OTHER DISEASES OF THE DIGESTIVE SYSTEM: ICD-10-CM

## 2021-09-30 DIAGNOSIS — Z80.0 FAMILY HISTORY OF MALIGNANT NEOPLASM OF DIGESTIVE ORGANS: ICD-10-CM

## 2021-09-30 DIAGNOSIS — N95.2 POSTMENOPAUSAL ATROPHIC VAGINITIS: ICD-10-CM

## 2021-09-30 PROCEDURE — G0101: CPT | Mod: GA

## 2021-09-30 PROCEDURE — G0328 FECAL BLOOD SCRN IMMUNOASSAY: CPT | Mod: QW

## 2021-10-01 LAB — HPV HIGH+LOW RISK DNA PNL CVX: NOT DETECTED

## 2021-10-05 LAB — CYTOLOGY CVX/VAG DOC THIN PREP: ABNORMAL

## 2021-10-15 ENCOUNTER — APPOINTMENT (OUTPATIENT)
Dept: INTERNAL MEDICINE | Facility: CLINIC | Age: 66
End: 2021-10-15
Payer: MEDICARE

## 2021-10-15 PROCEDURE — 96372 THER/PROPH/DIAG INJ SC/IM: CPT

## 2021-10-15 RX ORDER — CYANOCOBALAMIN 1000 UG/ML
1000 INJECTION INTRAMUSCULAR; SUBCUTANEOUS
Qty: 0 | Refills: 0 | Status: COMPLETED | OUTPATIENT
Start: 2021-10-15

## 2021-10-15 RX ORDER — CYANOCOBALAMIN 1000 UG/ML
1000 INJECTION INTRAMUSCULAR; SUBCUTANEOUS
Qty: 0 | Refills: 0 | Status: COMPLETED | OUTPATIENT
Start: 2021-09-17

## 2021-10-15 RX ADMIN — CYANOCOBALAMIN 0 MCG/ML: 1000 INJECTION INTRAMUSCULAR; SUBCUTANEOUS at 00:00

## 2021-10-28 ENCOUNTER — APPOINTMENT (OUTPATIENT)
Dept: OBGYN | Facility: CLINIC | Age: 66
End: 2021-10-28

## 2021-11-12 ENCOUNTER — APPOINTMENT (OUTPATIENT)
Dept: INTERNAL MEDICINE | Facility: CLINIC | Age: 66
End: 2021-11-12
Payer: MEDICARE

## 2021-11-12 PROCEDURE — 96372 THER/PROPH/DIAG INJ SC/IM: CPT

## 2021-11-12 RX ORDER — CYANOCOBALAMIN 1000 UG/ML
1000 INJECTION INTRAMUSCULAR; SUBCUTANEOUS
Qty: 0 | Refills: 0 | Status: COMPLETED | OUTPATIENT
Start: 2021-11-12

## 2021-12-13 ENCOUNTER — APPOINTMENT (OUTPATIENT)
Dept: INTERNAL MEDICINE | Facility: CLINIC | Age: 66
End: 2021-12-13
Payer: MEDICARE

## 2021-12-13 PROCEDURE — 96372 THER/PROPH/DIAG INJ SC/IM: CPT

## 2021-12-13 RX ORDER — CYANOCOBALAMIN 1000 UG/ML
1000 INJECTION INTRAMUSCULAR; SUBCUTANEOUS
Qty: 0 | Refills: 0 | Status: COMPLETED | OUTPATIENT
Start: 2021-12-13

## 2021-12-13 RX ADMIN — CYANOCOBALAMIN 0 MCG/ML: 1000 INJECTION INTRAMUSCULAR; SUBCUTANEOUS at 00:00

## 2022-01-10 ENCOUNTER — APPOINTMENT (OUTPATIENT)
Dept: INTERNAL MEDICINE | Facility: CLINIC | Age: 67
End: 2022-01-10

## 2022-01-13 ENCOUNTER — APPOINTMENT (OUTPATIENT)
Dept: ORTHOPEDIC SURGERY | Facility: CLINIC | Age: 67
End: 2022-01-13

## 2022-02-08 NOTE — ED PROVIDER NOTE - NS ED MD EM SELECTION
[Flexible Endoscope] : examined with the flexible endoscope [Serial Number: ___] : Serial Number: [unfilled] [Normal] : the paranasal sinuses had no abnormalities [FreeTextEntry6] : done for r etd - clear [id number] Comprehensive

## 2022-08-03 ENCOUNTER — APPOINTMENT (OUTPATIENT)
Dept: OPHTHALMOLOGY | Facility: CLINIC | Age: 67
End: 2022-08-03

## 2022-08-03 ENCOUNTER — NON-APPOINTMENT (OUTPATIENT)
Age: 67
End: 2022-08-03

## 2022-08-03 PROCEDURE — 92285 EXTERNAL OCULAR PHOTOGRAPHY: CPT

## 2022-08-03 PROCEDURE — 92004 COMPRE OPH EXAM NEW PT 1/>: CPT

## 2022-09-03 ENCOUNTER — NON-APPOINTMENT (OUTPATIENT)
Age: 67
End: 2022-09-03

## 2022-09-04 ENCOUNTER — TRANSCRIPTION ENCOUNTER (OUTPATIENT)
Age: 67
End: 2022-09-04

## 2022-11-21 ENCOUNTER — NON-APPOINTMENT (OUTPATIENT)
Age: 67
End: 2022-11-21

## 2022-11-21 ENCOUNTER — APPOINTMENT (OUTPATIENT)
Dept: INTERNAL MEDICINE | Facility: CLINIC | Age: 67
End: 2022-11-21

## 2022-11-21 VITALS
RESPIRATION RATE: 16 BRPM | OXYGEN SATURATION: 98 % | BODY MASS INDEX: 26.4 KG/M2 | HEIGHT: 63 IN | TEMPERATURE: 97 F | HEART RATE: 88 BPM | WEIGHT: 149 LBS

## 2022-11-21 DIAGNOSIS — M19.90 UNSPECIFIED OSTEOARTHRITIS, UNSPECIFIED SITE: ICD-10-CM

## 2022-11-21 DIAGNOSIS — E53.8 DEFICIENCY OF OTHER SPECIFIED B GROUP VITAMINS: ICD-10-CM

## 2022-11-21 DIAGNOSIS — Z86.2 PERSONAL HISTORY OF DISEASES OF THE BLOOD AND BLOOD-FORMING ORGANS AND CERTAIN DISORDERS INVOLVING THE IMMUNE MECHANISM: ICD-10-CM

## 2022-11-21 DIAGNOSIS — R92.2 INCONCLUSIVE MAMMOGRAM: ICD-10-CM

## 2022-11-21 PROCEDURE — 36415 COLL VENOUS BLD VENIPUNCTURE: CPT

## 2022-11-21 PROCEDURE — 93000 ELECTROCARDIOGRAM COMPLETE: CPT | Mod: 59

## 2022-11-21 PROCEDURE — G0438: CPT

## 2022-11-21 RX ORDER — TRAZODONE HYDROCHLORIDE 150 MG/1
150 TABLET ORAL
Refills: 0 | Status: ACTIVE | COMMUNITY

## 2022-11-21 RX ORDER — CELECOXIB 100 MG/1
100 CAPSULE ORAL
Qty: 30 | Refills: 2 | Status: DISCONTINUED | COMMUNITY
Start: 2021-07-08 | End: 2022-11-21

## 2022-11-21 RX ORDER — NITROFURANTOIN (MONOHYDRATE/MACROCRYSTALS) 25; 75 MG/1; MG/1
100 CAPSULE ORAL TWICE DAILY
Qty: 14 | Refills: 0 | Status: DISCONTINUED | COMMUNITY
Start: 2021-06-24 | End: 2022-11-21

## 2022-11-27 VITALS — SYSTOLIC BLOOD PRESSURE: 132 MMHG | DIASTOLIC BLOOD PRESSURE: 78 MMHG

## 2022-11-27 NOTE — HEALTH RISK ASSESSMENT
[Never] : Never [Yes] : Yes [No falls in past year] : Patient reported no falls in the past year [Fully functional (bathing, dressing, toileting, transferring, walking, feeding)] : Fully functional (bathing, dressing, toileting, transferring, walking, feeding) [Fully functional (using the telephone, shopping, preparing meals, housekeeping, doing laundry, using] : Fully functional and needs no help or supervision to perform IADLs (using the telephone, shopping, preparing meals, housekeeping, doing laundry, using transportation, managing medications and managing finances) [Reports changes in hearing] : Reports no changes in hearing [Reports changes in vision] : Reports no changes in vision [Reports changes in dental health] : Reports no changes in dental health

## 2022-11-27 NOTE — HISTORY OF PRESENT ILLNESS
[FreeTextEntry1] : The patient is here for a routine visit.  [de-identified] : Her diet is healthy.  She doesn't exercise much and weight is up a little.  \par \par She says she doesn't drink as much ETOH- about eight glasses per week.  She drinks about three times per week.

## 2022-11-27 NOTE — ASSESSMENT
[FreeTextEntry1] : Discussed diet and exercise.  BP ok at 132/78.  \par \par She sees her psychiatrist, Dr. Swanson, every two months.  She was counseled regarding ETOH avoidance.  \par \par Check a B-12 level (she had stopped B-12 shots).  \par \par Check blood tests requested by her psychiatrist.  \par \par Colonoscopy 5/15 and another was advised.  \par \par Mammogram and breast  due and advised.  DEXA advised.  Routine gyn visit advised.  \par \par She sees her dermatologist.  \par \par S/p Prevnar, Pneumovax, Shingrix, flu vaccine, COVID-19 bivalent vaccine.

## 2022-12-28 ENCOUNTER — NON-APPOINTMENT (OUTPATIENT)
Age: 67
End: 2022-12-28

## 2022-12-28 LAB
25(OH)D3 SERPL-MCNC: 32.9 NG/ML
ALBUMIN SERPL ELPH-MCNC: 4.7 G/DL
ALP BLD-CCNC: 75 U/L
ALT SERPL-CCNC: 14 U/L
ANION GAP SERPL CALC-SCNC: 12 MMOL/L
APPEARANCE: CLEAR
AST SERPL-CCNC: 23 U/L
BACTERIA: NEGATIVE
BASOPHILS # BLD AUTO: 0.03 K/UL
BASOPHILS NFR BLD AUTO: 0.5 %
BILIRUB SERPL-MCNC: 0.8 MG/DL
BILIRUBIN URINE: NEGATIVE
BLOOD URINE: ABNORMAL
BUN SERPL-MCNC: 14 MG/DL
CALCIUM SERPL-MCNC: 9.6 MG/DL
CHLORIDE SERPL-SCNC: 102 MMOL/L
CHOLEST SERPL-MCNC: 199 MG/DL
CO2 SERPL-SCNC: 23 MMOL/L
COLOR: NORMAL
CREAT SERPL-MCNC: 0.6 MG/DL
EGFR: 98 ML/MIN/1.73M2
EOSINOPHIL # BLD AUTO: 0.05 K/UL
EOSINOPHIL NFR BLD AUTO: 0.9 %
ESTIMATED AVERAGE GLUCOSE: 108 MG/DL
FERRITIN SERPL-MCNC: 100 NG/ML
FOLATE SERPL-MCNC: 18.8 NG/ML
GLUCOSE QUALITATIVE U: NEGATIVE
GLUCOSE SERPL-MCNC: 98 MG/DL
HBA1C MFR BLD HPLC: 5.4 %
HCT VFR BLD CALC: 41 %
HDLC SERPL-MCNC: 68 MG/DL
HGB BLD-MCNC: 13.6 G/DL
HYALINE CASTS: 0 /LPF
IMM GRANULOCYTES NFR BLD AUTO: 0.2 %
IRON SATN MFR SERPL: 40 %
IRON SERPL-MCNC: 124 UG/DL
KETONES URINE: NEGATIVE
LDLC SERPL CALC-MCNC: 113 MG/DL
LEUKOCYTE ESTERASE URINE: NEGATIVE
LYMPHOCYTES # BLD AUTO: 1.85 K/UL
LYMPHOCYTES NFR BLD AUTO: 33.3 %
MAN DIFF?: NORMAL
MCHC RBC-ENTMCNC: 32.9 PG
MCHC RBC-ENTMCNC: 33.2 GM/DL
MCV RBC AUTO: 99 FL
MICROSCOPIC-UA: NORMAL
MONOCYTES # BLD AUTO: 0.59 K/UL
MONOCYTES NFR BLD AUTO: 10.6 %
NEUTROPHILS # BLD AUTO: 3.02 K/UL
NEUTROPHILS NFR BLD AUTO: 54.5 %
NITRITE URINE: NEGATIVE
NONHDLC SERPL-MCNC: 131 MG/DL
PH URINE: 6
PLATELET # BLD AUTO: 204 K/UL
POTASSIUM SERPL-SCNC: 3.8 MMOL/L
PROT SERPL-MCNC: 7.3 G/DL
PROTEIN URINE: NEGATIVE
RBC # BLD: 4.14 M/UL
RBC # FLD: 13.2 %
RED BLOOD CELLS URINE: 0 /HPF
SODIUM SERPL-SCNC: 137 MMOL/L
SPECIFIC GRAVITY URINE: 1.01
SQUAMOUS EPITHELIAL CELLS: 0 /HPF
T4 FREE SERPL-MCNC: 1 NG/DL
TIBC SERPL-MCNC: 311 UG/DL
TRIGL SERPL-MCNC: 88 MG/DL
TSH SERPL-ACNC: 0.96 UIU/ML
UIBC SERPL-MCNC: 187 UG/DL
UROBILINOGEN URINE: NORMAL
VIT B12 SERPL-MCNC: 469 PG/ML
WBC # FLD AUTO: 5.55 K/UL
WHITE BLOOD CELLS URINE: 0 /HPF

## 2023-03-28 ENCOUNTER — NON-APPOINTMENT (OUTPATIENT)
Age: 68
End: 2023-03-28

## 2023-05-17 ENCOUNTER — NON-APPOINTMENT (OUTPATIENT)
Age: 68
End: 2023-05-17

## 2023-06-02 ENCOUNTER — APPOINTMENT (OUTPATIENT)
Dept: INTERNAL MEDICINE | Facility: CLINIC | Age: 68
End: 2023-06-02
Payer: MEDICARE

## 2023-06-02 VITALS
HEART RATE: 90 BPM | TEMPERATURE: 97.2 F | HEIGHT: 63 IN | BODY MASS INDEX: 25.16 KG/M2 | OXYGEN SATURATION: 97 % | WEIGHT: 142 LBS

## 2023-06-02 VITALS — DIASTOLIC BLOOD PRESSURE: 70 MMHG | SYSTOLIC BLOOD PRESSURE: 138 MMHG

## 2023-06-02 PROCEDURE — 99213 OFFICE O/P EST LOW 20 MIN: CPT | Mod: 25

## 2023-06-02 NOTE — ASSESSMENT
[FreeTextEntry1] : The patient has a URI and a persistent cough for three weeks.  She appears well with a normal lung exam.  Will treat with an antibiotic at this point and an Albuterol inhaler PRN.  Call PRN if the symptoms persist.

## 2023-06-02 NOTE — HISTORY OF PRESENT ILLNESS
[FreeTextEntry8] : The patient has symptoms for three weeks- anterior chest pain with cough and a persistent cough. There was green mucus which is now clear.  She [place] [facility] [place] ago and was prescribed Prednisone, Azelastine and Benzonatate.  She has finished the Prednisone.  She also used Nyquil and Dquil.  She has a little chest congestion and a tickle in the throat.  She comes in now with a persistent cough and slight dyspnea with exertion.  No fever, chills, sinus pain, ear pain.  She feels it is improving.

## 2023-09-09 ENCOUNTER — NON-APPOINTMENT (OUTPATIENT)
Age: 68
End: 2023-09-09

## 2023-09-12 ENCOUNTER — EMERGENCY (EMERGENCY)
Facility: HOSPITAL | Age: 68
LOS: 1 days | Discharge: HOME | End: 2023-09-12
Attending: STUDENT IN AN ORGANIZED HEALTH CARE EDUCATION/TRAINING PROGRAM
Payer: MEDICARE

## 2023-09-12 VITALS
HEART RATE: 87 BPM | OXYGEN SATURATION: 96 % | WEIGHT: 143.96 LBS | SYSTOLIC BLOOD PRESSURE: 127 MMHG | RESPIRATION RATE: 20 BRPM | HEIGHT: 62 IN | TEMPERATURE: 99 F | DIASTOLIC BLOOD PRESSURE: 77 MMHG

## 2023-09-12 VITALS
HEART RATE: 94 BPM | RESPIRATION RATE: 16 BRPM | SYSTOLIC BLOOD PRESSURE: 152 MMHG | DIASTOLIC BLOOD PRESSURE: 90 MMHG | OXYGEN SATURATION: 98 %

## 2023-09-12 LAB
ALBUMIN SERPL ELPH-MCNC: 4.5 G/DL — SIGNIFICANT CHANGE UP (ref 3.3–5)
ALP SERPL-CCNC: 73 U/L — SIGNIFICANT CHANGE UP (ref 40–120)
ALT FLD-CCNC: 15 U/L — SIGNIFICANT CHANGE UP (ref 10–45)
ANION GAP SERPL CALC-SCNC: 18 MMOL/L — HIGH (ref 5–17)
AST SERPL-CCNC: 28 U/L — SIGNIFICANT CHANGE UP (ref 10–40)
BILIRUB DIRECT SERPL-MCNC: <0.1 MG/DL — SIGNIFICANT CHANGE UP (ref 0–0.3)
BILIRUB INDIRECT FLD-MCNC: >0.3 MG/DL — SIGNIFICANT CHANGE UP (ref 0.2–1)
BILIRUB SERPL-MCNC: 0.4 MG/DL — SIGNIFICANT CHANGE UP (ref 0.2–1.2)
BUN SERPL-MCNC: 12 MG/DL — SIGNIFICANT CHANGE UP (ref 7–23)
CALCIUM SERPL-MCNC: 9.2 MG/DL — SIGNIFICANT CHANGE UP (ref 8.4–10.5)
CHLORIDE SERPL-SCNC: 100 MMOL/L — SIGNIFICANT CHANGE UP (ref 96–108)
CO2 SERPL-SCNC: 20 MMOL/L — LOW (ref 22–31)
CREAT SERPL-MCNC: 0.66 MG/DL — SIGNIFICANT CHANGE UP (ref 0.5–1.3)
EGFR: 95 ML/MIN/1.73M2 — SIGNIFICANT CHANGE UP
GLUCOSE SERPL-MCNC: 104 MG/DL — HIGH (ref 70–99)
HCT VFR BLD CALC: 42.9 % — SIGNIFICANT CHANGE UP (ref 34.5–45)
HGB BLD-MCNC: 14.5 G/DL — SIGNIFICANT CHANGE UP (ref 11.5–15.5)
MCHC RBC-ENTMCNC: 33.1 PG — SIGNIFICANT CHANGE UP (ref 27–34)
MCHC RBC-ENTMCNC: 33.8 GM/DL — SIGNIFICANT CHANGE UP (ref 32–36)
MCV RBC AUTO: 97.9 FL — SIGNIFICANT CHANGE UP (ref 80–100)
NRBC # BLD: 0 /100 WBCS — SIGNIFICANT CHANGE UP (ref 0–0)
PLATELET # BLD AUTO: 175 K/UL — SIGNIFICANT CHANGE UP (ref 150–400)
POTASSIUM SERPL-MCNC: 3.7 MMOL/L — SIGNIFICANT CHANGE UP (ref 3.5–5.3)
POTASSIUM SERPL-SCNC: 3.7 MMOL/L — SIGNIFICANT CHANGE UP (ref 3.5–5.3)
PROCALCITONIN SERPL-MCNC: 0.05 NG/ML — SIGNIFICANT CHANGE UP (ref 0.02–0.1)
PROT SERPL-MCNC: 7.9 G/DL — SIGNIFICANT CHANGE UP (ref 6–8.3)
RAPID RVP RESULT: DETECTED
RBC # BLD: 4.38 M/UL — SIGNIFICANT CHANGE UP (ref 3.8–5.2)
RBC # FLD: 13 % — SIGNIFICANT CHANGE UP (ref 10.3–14.5)
SARS-COV-2 RNA SPEC QL NAA+PROBE: DETECTED
SODIUM SERPL-SCNC: 138 MMOL/L — SIGNIFICANT CHANGE UP (ref 135–145)
WBC # BLD: 5.49 K/UL — SIGNIFICANT CHANGE UP (ref 3.8–10.5)
WBC # FLD AUTO: 5.49 K/UL — SIGNIFICANT CHANGE UP (ref 3.8–10.5)

## 2023-09-12 PROCEDURE — 99284 EMERGENCY DEPT VISIT MOD MDM: CPT

## 2023-09-12 PROCEDURE — 80048 BASIC METABOLIC PNL TOTAL CA: CPT

## 2023-09-12 PROCEDURE — 84145 PROCALCITONIN (PCT): CPT

## 2023-09-12 PROCEDURE — 0225U NFCT DS DNA&RNA 21 SARSCOV2: CPT

## 2023-09-12 PROCEDURE — 93005 ELECTROCARDIOGRAM TRACING: CPT

## 2023-09-12 PROCEDURE — 71045 X-RAY EXAM CHEST 1 VIEW: CPT

## 2023-09-12 PROCEDURE — 71045 X-RAY EXAM CHEST 1 VIEW: CPT | Mod: 26

## 2023-09-12 PROCEDURE — 80076 HEPATIC FUNCTION PANEL: CPT

## 2023-09-12 PROCEDURE — 85027 COMPLETE CBC AUTOMATED: CPT

## 2023-09-12 PROCEDURE — 99285 EMERGENCY DEPT VISIT HI MDM: CPT | Mod: 25

## 2023-09-12 NOTE — ED PROVIDER NOTE - CLINICAL SUMMARY MEDICAL DECISION MAKING FREE TEXT BOX
67-year-old female presenting with worsening SOB in setting of COVID infection. Already on Paxlovid. Initial exam reassuring: no increased work of breathing, lungs CTABL, normal O2 sat on RA. Will obtain labs to assess for signs of systemic infection, CXR to evaluate for consolidations/infiltrates, obtain ambulatory O2 sat. -Dari Blank MD (Attending)

## 2023-09-12 NOTE — ED PROVIDER NOTE - NS ED ROS FT
GEN: (+) fever, (-) fatigue  SKIN: (-) rash, (-) itching  EYES: (-) blurry vision, (-) double vision  ENMT: (+) congestion, (+) sore throat  CV: (-) chest pain, (-) palpitations  RESP: (+) cough, (+) SOB  GI: (-) abdominal pain, (-) nausea/vomiting  : (-) dysuria, (-) hematuria  MSK: (-) back pain, (-) joint pain  NEURO: (-) headache, (-) dizziness  PSYCH: (-) emotional stress

## 2023-09-12 NOTE — ED PROVIDER NOTE - PATIENT PORTAL LINK FT
You can access the FollowMyHealth Patient Portal offered by [facility] by registering at the following website: [email or web address]. By joining [employer]’s FollowMyHealth portal, you will also be able to view your health information using other applications (apps) compatible with our system.

## 2023-09-12 NOTE — ED ADULT NURSE NOTE - OBJECTIVE STATEMENT
66 yo presents to the ED from home. A&OX4, ambulatory with  at bedside c/o CLIFTON. pt tested positive for COVID 2 days ago and was started on plaxovid by urgent care. pt reports sore throat and some CLIFTON. history of asthma, denies recent flare or any intubation in the past. denies CP or SOB at rest. 20G inserted RAC. pt vaccinated for COVID. Patient undressed and placed into gown, call bell in hand and side rails up for safety. warm blanket provided, vital signs stable, pt in no acute distress.

## 2023-09-12 NOTE — ED PROVIDER NOTE - NS_EDPROVIDERDISPOUSERTYPE_ED_A_ED
1. Have you been to the ER, urgent care clinic since your last visit? Hospitalized since your last visit? No    2. Have you seen or consulted any other health care providers outside of the [address] [place] [address] since your last visit? Include any pap smears or colon screening.  No  Reviewed record in preparation for visit and have necessary documentation  opportunity was given for questions  Goals that were addressed and/or need to be completed during or after this appointment include    Health Maintenance Due   Topic Date Due    GLAUCOMA SCREENING Q2Y  04/13/2019    Colonoscopy  07/27/2019    Influenza Age 5 to Adult  08/01/2020 Attending Attestation (For Attendings USE Only)...

## 2023-09-12 NOTE — ED PROVIDER NOTE - PHYSICAL EXAMINATION
GEN: awake and alert, well-appearing, no acute distress  SKIN: (+) warm/dry, (-) cyanosis, (-) rash  HEAD: (-) scalp swelling, (-) tenderness  EYES: (-) conjunctival pallor, (-) scleral icterus  ENMT: (+) slightly hoarse voice; (+) moist mucous membranes, (+) airway patent, (-) stridor  NECK: (-) tenderness, (-) stiffness  CV: (+) RRR, (-) murmurs/rubs/gallops  RESP: (+) CTABL, (-) increased WOB, (-) rales, (-) rhonchi, (-) wheezing  ABD: (+) soft, (-) tenderness, (-) guarding  EXT: (-) joint deformities, (-) edema, (-) tenderness, (+) grossly intact ROM, (+) equal pulses in upper & lower extremities  NEURO: mental status as above, (-) gross strength/sensation deficits

## 2023-09-12 NOTE — ED PROVIDER NOTE - PROGRESS NOTE DETAILS
Patient reassessed, states she is feeling better.  Labs with no significant leukocytosis or electrolyte abnormalities.  No significant consolidations or infiltrates on x-ray.  Ambulatory O2 sat 98%, patient did not notice significantly increased shortness of breath.  Advised to continue taking Paxlovid as prescribed by her PMD and follow-up within the next few days.  Advised to return if worsening shortness of breath or chest pain, vomiting or diarrhea, or other concerning symptoms. -Dari Blank MD (Attending) Patient reassessed, states she is feeling better.  Labs with no significant leukocytosis or electrolyte abnormalities.  No significant consolidations or infiltrates on x-ray.  Ambulatory O2 sat 98%, patient did not notice significantly increased shortness of breath.  Advised to continue taking Paxlovid as prescribed by her PMD and follow-up within the next few days.  Advised to return if worsening shortness of breath or chest pain, vomiting or diarrhea, or other concerning symptoms. Patient expressed agreement with and understanding of the results and plan. -Dari Blank MD (Attending)

## 2023-09-12 NOTE — ED PROVIDER NOTE - NSFOLLOWUPINSTRUCTIONS_ED_ALL_ED_FT
You were seen in the ER for shortness of breath.  Your blood work and chest x-ray were normal.  Continue taking Paxlovid as prescribed by your PMD.  Follow-up within the next few days.  Please return to the ER if you have worsening shortness of breath, chest pain, vomiting or diarrhea, high fevers or chills, or other symptoms that concern you.

## 2023-09-12 NOTE — ED PROVIDER NOTE - OBJECTIVE STATEMENT
67-year-old female, hx of asthma, osteoarthritis, prior , presenting with SOB. Patient was diagnosed with COVID on . Has been having symptoms of sore throat, cough with occasional phlegm production, SOB, and fevers/chills. Tmax of 101.3 on . Was started on Paxlovid on  but has noted worsening SOB with exertion so presenting to the ED for evaluation today. Otherwise denies sick contacts, recent travel, nausea/vomiting, diarrhea, chest pain, abdominal pain.

## 2023-09-12 NOTE — ED PROVIDER NOTE - IV ALTEPLASE INCLUSION HIDDEN
[FreeTextEntry6] : Called for Emergency to [facility]\par Student unresponsive \par \par Upon arrive student A/0x3, sitting upright on phone \par Refusing to talk, uncooperative \par 
show

## 2023-09-14 ENCOUNTER — NON-APPOINTMENT (OUTPATIENT)
Age: 68
End: 2023-09-14

## 2023-12-22 ENCOUNTER — APPOINTMENT (OUTPATIENT)
Dept: OPHTHALMOLOGY | Facility: CLINIC | Age: 68
End: 2023-12-22
Payer: MEDICARE

## 2023-12-22 ENCOUNTER — NON-APPOINTMENT (OUTPATIENT)
Age: 68
End: 2023-12-22

## 2023-12-22 PROCEDURE — 92014 COMPRE OPH EXAM EST PT 1/>: CPT

## 2024-01-18 ENCOUNTER — APPOINTMENT (OUTPATIENT)
Dept: OBGYN | Facility: CLINIC | Age: 69
End: 2024-01-18

## 2024-02-09 ENCOUNTER — APPOINTMENT (OUTPATIENT)
Dept: OBGYN | Facility: CLINIC | Age: 69
End: 2024-02-09
Payer: MEDICARE

## 2024-02-09 VITALS
SYSTOLIC BLOOD PRESSURE: 153 MMHG | BODY MASS INDEX: 25.69 KG/M2 | DIASTOLIC BLOOD PRESSURE: 87 MMHG | WEIGHT: 145 LBS | HEIGHT: 63 IN

## 2024-02-09 VITALS — SYSTOLIC BLOOD PRESSURE: 124 MMHG | DIASTOLIC BLOOD PRESSURE: 68 MMHG

## 2024-02-09 DIAGNOSIS — Z01.419 ENCOUNTER FOR GYNECOLOGICAL EXAMINATION (GENERAL) (ROUTINE) W/OUT ABNORMAL FINDINGS: ICD-10-CM

## 2024-02-09 PROCEDURE — G0101: CPT

## 2024-02-09 NOTE — SIGNATURES
[TextEntry] : This note was written by Jeremy Nelson on 02/09/2024 actively solely RUSS Liao M.D.  All medical record entries made by the Scribe were at my, RUSS Liao M.D. direction and personally dictated by me on 02/09/2024. I have personally reviewed the chart and agree that the record reflects my personal performance of the history, physical exam, assessment, and plan.

## 2024-02-09 NOTE — PLAN
[FreeTextEntry1] : Routine Gyn Exam: BSA, calcium, vitamin D and exercise d/w pt Pap /HPV  Rx given for mammogram and breast sonogram DXA d/w patient, due now Pt to schedule pelvic sono Advised pt to see PCP and dermatologist annually  Repeat BP is 124/68  RTO for DEXA and TVUS or PRN or for annual

## 2024-02-09 NOTE — PHYSICAL EXAM
[Chaperone Present] : A chaperone was present in the examining room during all aspects of the physical examination [Appropriately responsive] : appropriately responsive [Alert] : alert [No Acute Distress] : no acute distress [No Lymphadenopathy] : no lymphadenopathy [Regular Rate Rhythm] : regular rate rhythm [No Murmurs] : no murmurs [Clear to Auscultation B/L] : clear to auscultation bilaterally [Soft] : soft [Non-tender] : non-tender [Non-distended] : non-distended [No HSM] : No HSM [No Lesions] : no lesions [No Mass] : no mass [Oriented x3] : oriented x3 [Examination Of The Breasts] : a normal appearance [No Masses] : no breast masses were palpable [Vulvar Atrophy] : vulvar atrophy [Labia Majora] : normal [Labia Minora] : normal [Atrophy] : atrophy [Normal] : normal [Uterine Adnexae] : normal [FreeTextEntry1] : david [FreeTextEntry9] : no masses, guaiac negative

## 2024-02-09 NOTE — HISTORY OF PRESENT ILLNESS
[Patient reported PAP Smear was normal] : Patient reported PAP Smear was normal [Patient reported colonoscopy was normal] : Patient reported colonoscopy was normal [FreeTextEntry1] : 2024. AYANNA ALBARRAN 68 year old female  LMP pm. She presents for an annual gyn exam.  She denies abdominal and pelvic pain, no vaginal bleeding, discharge or vaginitis symptoms. She reports normal urination, no dysuria, no incontinence of urine. BM is normal per patient, no blood in stool or constipation/diarrhea.  No new medical conditions or surgeries since her last gyn visit in 2021.  MedHx BBB, abdominal melanoma, vaginal HSV, arthritis, anxiety, depression, stomach ulcer. GynHx vaginal HSV. No abnml PAP. No FIbroid, + ovarian cysts Meds sertraline, trazodone, Klonopin PRN, Pantoprazole OBHx c/s in  and TOP at 24 y/o SurgHx c/s x1, melanoma removal. All: NKDA FHx: No FHx of breast, ovarian, or uterine cancer. FHx of mother w/ Alzheimer's, melanoma, maternal grandfather w/ possible colon ca, paternal grandfather  of unspecified ca, paternal aunt w/ lung ca. brother- prostate ca in his 70s SocHx former smoker d/c'd in , social alcohol drinker- 6 drinks/wk. no drug use  [TextBox_19] : no recent imaging [PapSmeardate] : 09/21 [TextBox_31] : NILM HRHPV- [TextBox_37] : no recent testing [ColonoscopyDate] : 01/24 [TextBox_43] : endoscopy nml. due 5 yrs

## 2024-02-13 LAB — HPV HIGH+LOW RISK DNA PNL CVX: NOT DETECTED

## 2024-02-14 ENCOUNTER — APPOINTMENT (OUTPATIENT)
Dept: OBGYN | Facility: CLINIC | Age: 69
End: 2024-02-14

## 2024-02-16 LAB — CYTOLOGY CVX/VAG DOC THIN PREP: ABNORMAL

## 2024-03-07 ENCOUNTER — APPOINTMENT (OUTPATIENT)
Dept: MAMMOGRAPHY | Facility: CLINIC | Age: 69
End: 2024-03-07
Payer: MEDICARE

## 2024-03-07 ENCOUNTER — RESULT REVIEW (OUTPATIENT)
Age: 69
End: 2024-03-07

## 2024-03-07 ENCOUNTER — APPOINTMENT (OUTPATIENT)
Dept: ULTRASOUND IMAGING | Facility: CLINIC | Age: 69
End: 2024-03-07
Payer: MEDICARE

## 2024-03-07 PROCEDURE — 77067 SCR MAMMO BI INCL CAD: CPT

## 2024-03-07 PROCEDURE — 77063 BREAST TOMOSYNTHESIS BI: CPT

## 2024-03-07 PROCEDURE — 76641 ULTRASOUND BREAST COMPLETE: CPT | Mod: 50,GY

## 2024-03-14 ENCOUNTER — APPOINTMENT (OUTPATIENT)
Dept: OBGYN | Facility: CLINIC | Age: 69
End: 2024-03-14
Payer: MEDICARE

## 2024-03-14 ENCOUNTER — ASOB RESULT (OUTPATIENT)
Age: 69
End: 2024-03-14

## 2024-03-14 PROCEDURE — 76830 TRANSVAGINAL US NON-OB: CPT

## 2024-03-14 PROCEDURE — 77080 DXA BONE DENSITY AXIAL: CPT

## 2024-03-18 ENCOUNTER — APPOINTMENT (OUTPATIENT)
Dept: INTERNAL MEDICINE | Facility: CLINIC | Age: 69
End: 2024-03-18
Payer: MEDICARE

## 2024-03-18 ENCOUNTER — NON-APPOINTMENT (OUTPATIENT)
Age: 69
End: 2024-03-18

## 2024-03-18 VITALS
HEIGHT: 63 IN | WEIGHT: 144 LBS | TEMPERATURE: 97.7 F | OXYGEN SATURATION: 98 % | BODY MASS INDEX: 25.52 KG/M2 | HEART RATE: 78 BPM

## 2024-03-18 DIAGNOSIS — R31.29 OTHER MICROSCOPIC HEMATURIA: ICD-10-CM

## 2024-03-18 DIAGNOSIS — Z92.29 PERSONAL HISTORY OF OTHER DRUG THERAPY: ICD-10-CM

## 2024-03-18 DIAGNOSIS — R00.2 PALPITATIONS: ICD-10-CM

## 2024-03-18 DIAGNOSIS — I45.10 UNSPECIFIED RIGHT BUNDLE-BRANCH BLOCK: ICD-10-CM

## 2024-03-18 DIAGNOSIS — R03.0 ELEVATED BLOOD-PRESSURE READING, W/OUT DIAGNOSIS OF HYPERTENSION: ICD-10-CM

## 2024-03-18 DIAGNOSIS — Z23 ENCOUNTER FOR IMMUNIZATION: ICD-10-CM

## 2024-03-18 DIAGNOSIS — M81.0 AGE-RELATED OSTEOPOROSIS W/OUT CURRENT PATHOLOGICAL FRACTURE: ICD-10-CM

## 2024-03-18 PROCEDURE — 93000 ELECTROCARDIOGRAM COMPLETE: CPT

## 2024-03-18 PROCEDURE — 36415 COLL VENOUS BLD VENIPUNCTURE: CPT

## 2024-03-18 PROCEDURE — G0439: CPT

## 2024-03-18 RX ORDER — ALBUTEROL SULFATE 90 UG/1
108 (90 BASE) INHALANT RESPIRATORY (INHALATION)
Qty: 1 | Refills: 3 | Status: DISCONTINUED | COMMUNITY
Start: 2023-06-02 | End: 2024-03-18

## 2024-03-18 RX ORDER — SERTRALINE HYDROCHLORIDE 50 MG/1
50 TABLET, FILM COATED ORAL
Qty: 90 | Refills: 1 | Status: ACTIVE | COMMUNITY
Start: 2018-02-27

## 2024-03-18 RX ORDER — AMOXICILLIN AND CLAVULANATE POTASSIUM 875; 125 MG/1; MG/1
875-125 TABLET, COATED ORAL TWICE DAILY
Qty: 14 | Refills: 0 | Status: DISCONTINUED | COMMUNITY
Start: 2023-06-02 | End: 2024-03-18

## 2024-03-24 VITALS — DIASTOLIC BLOOD PRESSURE: 80 MMHG | SYSTOLIC BLOOD PRESSURE: 130 MMHG

## 2024-03-24 NOTE — HISTORY OF PRESENT ILLNESS
[FreeTextEntry1] : The patient is here for a routine visit.  [de-identified] : She isn't exercising.  No chest pain or dyspnea.  Diet is fair.   She drinks occasional ETOH.

## 2024-03-24 NOTE — PHYSICAL EXAM
[No Acute Distress] : no acute distress [Well Nourished] : well nourished [Well Developed] : well developed [Normal Sclera/Conjunctiva] : normal sclera/conjunctiva [Well-Appearing] : well-appearing [PERRL] : pupils equal round and reactive to light [EOMI] : extraocular movements intact [Normal Oropharynx] : the oropharynx was normal [Normal Outer Ear/Nose] : the outer ears and nose were normal in appearance [No JVD] : no jugular venous distention [No Lymphadenopathy] : no lymphadenopathy [Supple] : supple [Thyroid Normal, No Nodules] : the thyroid was normal and there were no nodules present [No Respiratory Distress] : no respiratory distress  [No Accessory Muscle Use] : no accessory muscle use [Clear to Auscultation] : lungs were clear to auscultation bilaterally [Normal Rate] : normal rate  [Regular Rhythm] : with a regular rhythm [Normal S1, S2] : normal S1 and S2 [No Murmur] : no murmur heard [No Carotid Bruits] : no carotid bruits [No Abdominal Bruit] : a ~M bruit was not heard ~T in the abdomen [No Varicosities] : no varicosities [Pedal Pulses Present] : the pedal pulses are present [No Edema] : there was no peripheral edema [No Palpable Aorta] : no palpable aorta [No Extremity Clubbing/Cyanosis] : no extremity clubbing/cyanosis [Soft] : abdomen soft [Non Tender] : non-tender [No Masses] : no abdominal mass palpated [Non-distended] : non-distended [No HSM] : no HSM [Normal Posterior Cervical Nodes] : no posterior cervical lymphadenopathy [Normal Bowel Sounds] : normal bowel sounds [Normal Anterior Cervical Nodes] : no anterior cervical lymphadenopathy [No Spinal Tenderness] : no spinal tenderness [No CVA Tenderness] : no CVA  tenderness [Grossly Normal Strength/Tone] : grossly normal strength/tone [No Joint Swelling] : no joint swelling [No Rash] : no rash [Coordination Grossly Intact] : coordination grossly intact [No Focal Deficits] : no focal deficits [Deep Tendon Reflexes (DTR)] : deep tendon reflexes were 2+ and symmetric [Normal Gait] : normal gait [Normal Affect] : the affect was normal [Normal Insight/Judgement] : insight and judgment were intact

## 2024-03-24 NOTE — ASSESSMENT
[FreeTextEntry1] : Discussed diet, exercise, weight loss.  The BP is ok at 130/80.    She had a swallowing issue and had an EGD and she also had a colonoscopy by Dr. Quique Nicole.  Will have the reports sent.  She sees her psychiatrist, Dr. Swanson and she says she is doing well.    DEXA shows borderline osteoporosis.  Will refer to endocrine.    Check iron studies on ferrous sulfate. Check a B-12 which was WNL.    S/p flu vaccine, new COVID-19 vaccine, Prevnar 13, Pneumovax, Shingrix.  Gyn 2/24.  She sees a dermatologist and ophthalmologist.

## 2024-03-24 NOTE — HEALTH RISK ASSESSMENT
[No falls in past year] : Patient reported no falls in the past year [0] : 2) Feeling down, depressed, or hopeless: Not at all (0) [[id number]] : 0 [Fully functional (bathing, dressing, toileting, transferring, walking, feeding)] : Fully functional (bathing, dressing, toileting, transferring, walking, feeding) [Fully functional (using the telephone, shopping, preparing meals, housekeeping, doing laundry, using] : Fully functional and needs no help or supervision to perform IADLs (using the telephone, shopping, preparing meals, housekeeping, doing laundry, using transportation, managing medications and managing finances) [Reports changes in hearing] : Reports no changes in hearing [Reports changes in vision] : Reports no changes in vision [Reports changes in dental health] : Reports no changes in dental health

## 2024-03-27 ENCOUNTER — NON-APPOINTMENT (OUTPATIENT)
Age: 69
End: 2024-03-27

## 2024-07-01 LAB
25(OH)D3 SERPL-MCNC: 34.4 NG/ML
ALBUMIN SERPL ELPH-MCNC: 4.5 G/DL
ALP BLD-CCNC: 90 U/L
ALT SERPL-CCNC: 15 U/L
ANION GAP SERPL CALC-SCNC: 13 MMOL/L
APPEARANCE: CLEAR
AST SERPL-CCNC: 18 U/L
BACTERIA: NEGATIVE /HPF
BILIRUB SERPL-MCNC: 0.5 MG/DL
BILIRUBIN URINE: NEGATIVE
BLOOD URINE: NEGATIVE
BUN SERPL-MCNC: 18 MG/DL
CALCIUM OXALATE CRYSTALS: PRESENT
CALCIUM SERPL-MCNC: 9.6 MG/DL
CAST: 1 /LPF
CHLORIDE SERPL-SCNC: 103 MMOL/L
CHOLEST SERPL-MCNC: 200 MG/DL
CO2 SERPL-SCNC: 23 MMOL/L
COLOR: YELLOW
CREAT SERPL-MCNC: 0.66 MG/DL
EGFR: 95 ML/MIN/1.73M2
EPITHELIAL CELLS: 1 /HPF
ESTIMATED AVERAGE GLUCOSE: 108 MG/DL
FERRITIN SERPL-MCNC: 92 NG/ML
GLUCOSE QUALITATIVE U: NEGATIVE MG/DL
GLUCOSE SERPL-MCNC: 101 MG/DL
HBA1C MFR BLD HPLC: 5.4 %
HCT VFR BLD CALC: 40.2 %
HDLC SERPL-MCNC: 66 MG/DL
HGB BLD-MCNC: 13.5 G/DL
IRON SATN MFR SERPL: 40 %
IRON SERPL-MCNC: 112 UG/DL
KETONES URINE: ABNORMAL MG/DL
LDLC SERPL CALC-MCNC: 109 MG/DL
LEUKOCYTE ESTERASE URINE: NEGATIVE
MCHC RBC-ENTMCNC: 33.5 PG
MCHC RBC-ENTMCNC: 33.6 GM/DL
MCV RBC AUTO: 99.8 FL
MICROSCOPIC-UA: NORMAL
NITRITE URINE: NEGATIVE
NONHDLC SERPL-MCNC: 133 MG/DL
PH URINE: 5.5
PLATELET # BLD AUTO: 238 K/UL
POTASSIUM SERPL-SCNC: 4.1 MMOL/L
PROT SERPL-MCNC: 7.4 G/DL
PROTEIN URINE: NEGATIVE MG/DL
RBC # BLD: 4.03 M/UL
RBC # FLD: 13.8 %
RED BLOOD CELLS URINE: 4 /HPF
REVIEW: NORMAL
SODIUM SERPL-SCNC: 139 MMOL/L
SPECIFIC GRAVITY URINE: >1.03
T4 FREE SERPL-MCNC: 1 NG/DL
TIBC SERPL-MCNC: 284 UG/DL
TRIGL SERPL-MCNC: 137 MG/DL
TSH SERPL-ACNC: 1.56 UIU/ML
UIBC SERPL-MCNC: 172 UG/DL
UROBILINOGEN URINE: 1 MG/DL
VIT B12 SERPL-MCNC: 527 PG/ML
WBC # FLD AUTO: 4.85 K/UL
WHITE BLOOD CELLS URINE: 0 /HPF

## 2025-06-20 ENCOUNTER — NON-APPOINTMENT (OUTPATIENT)
Age: 70
End: 2025-06-20

## 2025-06-20 ENCOUNTER — APPOINTMENT (OUTPATIENT)
Dept: OPHTHALMOLOGY | Facility: CLINIC | Age: 70
End: 2025-06-20
Payer: MEDICARE

## 2025-06-20 PROCEDURE — 92250 FUNDUS PHOTOGRAPHY W/I&R: CPT

## 2025-06-20 PROCEDURE — 92014 COMPRE OPH EXAM EST PT 1/>: CPT

## 2025-08-25 ENCOUNTER — NON-APPOINTMENT (OUTPATIENT)
Age: 70
End: 2025-08-25

## 2025-08-26 ENCOUNTER — APPOINTMENT (OUTPATIENT)
Dept: INTERNAL MEDICINE | Facility: CLINIC | Age: 70
End: 2025-08-26
Payer: MEDICARE

## 2025-08-26 VITALS
HEART RATE: 112 BPM | WEIGHT: 142.25 LBS | BODY MASS INDEX: 26.18 KG/M2 | OXYGEN SATURATION: 99 % | HEIGHT: 61.81 IN | TEMPERATURE: 98 F

## 2025-08-26 DIAGNOSIS — Z86.2 PERSONAL HISTORY OF DISEASES OF THE BLOOD AND BLOOD-FORMING ORGANS AND CERTAIN DISORDERS INVOLVING THE IMMUNE MECHANISM: ICD-10-CM

## 2025-08-26 DIAGNOSIS — Z00.00 ENCOUNTER FOR GENERAL ADULT MEDICAL EXAMINATION W/OUT ABNORMAL FINDINGS: ICD-10-CM

## 2025-08-26 DIAGNOSIS — F41.8 OTHER SPECIFIED ANXIETY DISORDERS: ICD-10-CM

## 2025-08-26 DIAGNOSIS — M81.0 AGE-RELATED OSTEOPOROSIS W/OUT CURRENT PATHOLOGICAL FRACTURE: ICD-10-CM

## 2025-08-26 DIAGNOSIS — I45.10 UNSPECIFIED RIGHT BUNDLE-BRANCH BLOCK: ICD-10-CM

## 2025-08-26 DIAGNOSIS — E53.8 DEFICIENCY OF OTHER SPECIFIED B GROUP VITAMINS: ICD-10-CM

## 2025-08-26 PROCEDURE — 93000 ELECTROCARDIOGRAM COMPLETE: CPT

## 2025-08-26 PROCEDURE — 36415 COLL VENOUS BLD VENIPUNCTURE: CPT

## 2025-08-26 PROCEDURE — G0439: CPT

## 2025-08-27 LAB
25(OH)D3 SERPL-MCNC: 36.9 NG/ML
ALBUMIN SERPL ELPH-MCNC: 4.5 G/DL
ALP BLD-CCNC: 84 U/L
ALT SERPL-CCNC: 12 U/L
ANION GAP SERPL CALC-SCNC: 15 MMOL/L
APPEARANCE: CLEAR
AST SERPL-CCNC: 20 U/L
BACTERIA: NEGATIVE /HPF
BASOPHILS # BLD AUTO: 0.03 K/UL
BASOPHILS NFR BLD AUTO: 0.6 %
BILIRUB SERPL-MCNC: 0.7 MG/DL
BILIRUBIN URINE: NEGATIVE
BLOOD URINE: ABNORMAL
BUN SERPL-MCNC: 10 MG/DL
CALCIUM OXALATE CRYSTALS: PRESENT
CALCIUM SERPL-MCNC: 9.7 MG/DL
CAST: 3 /LPF
CHLORIDE SERPL-SCNC: 104 MMOL/L
CHOLEST SERPL-MCNC: 192 MG/DL
CO2 SERPL-SCNC: 20 MMOL/L
COLOR: NORMAL
CREAT SERPL-MCNC: 0.65 MG/DL
EGFRCR SERPLBLD CKD-EPI 2021: 95 ML/MIN/1.73M2
EOSINOPHIL # BLD AUTO: 0.06 K/UL
EOSINOPHIL NFR BLD AUTO: 1.2 %
EPITHELIAL CELLS: 2 /HPF
ESTIMATED AVERAGE GLUCOSE: 105 MG/DL
FERRITIN SERPL-MCNC: 130 NG/ML
GLUCOSE QUALITATIVE U: NEGATIVE MG/DL
GLUCOSE SERPL-MCNC: 102 MG/DL
HBA1C MFR BLD HPLC: 5.3 %
HCT VFR BLD CALC: 41.5 %
HDLC SERPL-MCNC: 54 MG/DL
HGB BLD-MCNC: 14.1 G/DL
HYALINE CASTS: PRESENT
IMM GRANULOCYTES NFR BLD AUTO: 0 %
IRON SATN MFR SERPL: 30 %
IRON SERPL-MCNC: 80 UG/DL
KETONES URINE: ABNORMAL MG/DL
LDLC SERPL-MCNC: 114 MG/DL
LEUKOCYTE ESTERASE URINE: ABNORMAL
LYMPHOCYTES # BLD AUTO: 1.87 K/UL
LYMPHOCYTES NFR BLD AUTO: 38.3 %
MAN DIFF?: NORMAL
MCHC RBC-ENTMCNC: 33 PG
MCHC RBC-ENTMCNC: 34 G/DL
MCV RBC AUTO: 97.2 FL
MICROSCOPIC-UA: NORMAL
MONOCYTES # BLD AUTO: 0.47 K/UL
MONOCYTES NFR BLD AUTO: 9.6 %
MUCUS: PRESENT
NEUTROPHILS # BLD AUTO: 2.45 K/UL
NEUTROPHILS NFR BLD AUTO: 50.3 %
NITRITE URINE: NEGATIVE
NONHDLC SERPL-MCNC: 138 MG/DL
PH URINE: 6
PLATELET # BLD AUTO: 202 K/UL
POTASSIUM SERPL-SCNC: 3.8 MMOL/L
PROT SERPL-MCNC: 7.2 G/DL
PROTEIN URINE: NORMAL MG/DL
RBC # BLD: 4.27 M/UL
RBC # FLD: 13.2 %
RED BLOOD CELLS URINE: 6 /HPF
REVIEW: NORMAL
SODIUM SERPL-SCNC: 139 MMOL/L
SPECIFIC GRAVITY URINE: 1.02
T4 FREE SERPL-MCNC: 1.2 NG/DL
TIBC SERPL-MCNC: 264 UG/DL
TRIGL SERPL-MCNC: 137 MG/DL
TSH SERPL-ACNC: 1.22 UIU/ML
UIBC SERPL-MCNC: 184 UG/DL
UROBILINOGEN URINE: 1 MG/DL
VIT B12 SERPL-MCNC: 478 PG/ML
WBC # FLD AUTO: 4.88 K/UL
WHITE BLOOD CELLS URINE: 0 /HPF

## 2025-08-28 VITALS — SYSTOLIC BLOOD PRESSURE: 125 MMHG | DIASTOLIC BLOOD PRESSURE: 78 MMHG
